# Patient Record
Sex: MALE | Race: WHITE | NOT HISPANIC OR LATINO | Employment: OTHER | URBAN - METROPOLITAN AREA
[De-identification: names, ages, dates, MRNs, and addresses within clinical notes are randomized per-mention and may not be internally consistent; named-entity substitution may affect disease eponyms.]

---

## 2019-01-01 RX ORDER — PSEUDOEPHED/ACETAMINOPH/DIPHEN 30MG-500MG
TABLET ORAL
Qty: 100 TABLET | Refills: 2 | OUTPATIENT
Start: 2019-01-01

## 2020-01-01 ENCOUNTER — APPOINTMENT (OUTPATIENT)
Dept: CT IMAGING | Facility: HOSPITAL | Age: 65
End: 2020-01-01

## 2020-01-01 ENCOUNTER — APPOINTMENT (OUTPATIENT)
Dept: GENERAL RADIOLOGY | Facility: HOSPITAL | Age: 65
End: 2020-01-01

## 2020-01-01 ENCOUNTER — APPOINTMENT (OUTPATIENT)
Dept: CARDIOLOGY | Facility: HOSPITAL | Age: 65
End: 2020-01-01

## 2020-01-01 ENCOUNTER — HOSPITAL ENCOUNTER (INPATIENT)
Facility: HOSPITAL | Age: 65
LOS: 1 days | End: 2020-04-14
Attending: EMERGENCY MEDICINE | Admitting: INTERNAL MEDICINE

## 2020-01-01 VITALS
TEMPERATURE: 98.2 F | DIASTOLIC BLOOD PRESSURE: 92 MMHG | BODY MASS INDEX: 36.01 KG/M2 | HEIGHT: 77 IN | WEIGHT: 305 LBS | OXYGEN SATURATION: 87 % | SYSTOLIC BLOOD PRESSURE: 121 MMHG | RESPIRATION RATE: 30 BRPM

## 2020-01-01 DIAGNOSIS — J96.21 ACUTE ON CHRONIC RESPIRATORY FAILURE WITH HYPOXIA (HCC): ICD-10-CM

## 2020-01-01 DIAGNOSIS — J40 BRONCHITIS: ICD-10-CM

## 2020-01-01 DIAGNOSIS — J18.9 PNEUMONIA OF LEFT LOWER LOBE DUE TO INFECTIOUS ORGANISM: ICD-10-CM

## 2020-01-01 DIAGNOSIS — Z20.822 SUSPECTED COVID-19 VIRUS INFECTION: ICD-10-CM

## 2020-01-01 DIAGNOSIS — A41.9 SEPSIS WITHOUT ACUTE ORGAN DYSFUNCTION, DUE TO UNSPECIFIED ORGANISM (HCC): ICD-10-CM

## 2020-01-01 DIAGNOSIS — I26.99 OTHER ACUTE PULMONARY EMBOLISM WITHOUT ACUTE COR PULMONALE (HCC): Primary | ICD-10-CM

## 2020-01-01 LAB
ABO GROUP BLD: NORMAL
ALBUMIN SERPL-MCNC: 2.9 G/DL (ref 3.5–5.2)
ALBUMIN SERPL-MCNC: 3 G/DL (ref 3.5–5.2)
ALBUMIN/GLOB SERPL: 0.9 G/DL
ALBUMIN/GLOB SERPL: 0.9 G/DL
ALP SERPL-CCNC: 107 U/L (ref 39–117)
ALP SERPL-CCNC: 98 U/L (ref 39–117)
ALT SERPL W P-5'-P-CCNC: 27 U/L (ref 1–41)
ALT SERPL W P-5'-P-CCNC: 31 U/L (ref 1–41)
ANION GAP SERPL CALCULATED.3IONS-SCNC: 12.8 MMOL/L (ref 5–15)
ANION GAP SERPL CALCULATED.3IONS-SCNC: 17.9 MMOL/L (ref 5–15)
AORTIC DIMENSIONLESS INDEX: 0.8 (DI)
ARTERIAL PATENCY WRIST A: POSITIVE
ARTERIAL PATENCY WRIST A: POSITIVE
AST SERPL-CCNC: 24 U/L (ref 1–40)
AST SERPL-CCNC: 25 U/L (ref 1–40)
ATMOSPHERIC PRESS: 748.7 MMHG
ATMOSPHERIC PRESS: 754.6 MMHG
B PARAPERT DNA SPEC QL NAA+PROBE: NOT DETECTED
B PERT DNA SPEC QL NAA+PROBE: NOT DETECTED
BASE EXCESS BLDA CALC-SCNC: -0.5 MMOL/L (ref 0–2)
BASE EXCESS BLDA CALC-SCNC: 1.1 MMOL/L (ref 0–2)
BASOPHILS # BLD AUTO: 0.03 10*3/MM3 (ref 0–0.2)
BASOPHILS # BLD AUTO: 0.04 10*3/MM3 (ref 0–0.2)
BASOPHILS NFR BLD AUTO: 0.1 % (ref 0–1.5)
BASOPHILS NFR BLD AUTO: 0.2 % (ref 0–1.5)
BDY SITE: ABNORMAL
BDY SITE: ABNORMAL
BH CV ECHO MEAS - ACS: 2.4 CM
BH CV ECHO MEAS - AI DEC SLOPE: 482 CM/SEC^2
BH CV ECHO MEAS - AI MAX PG: 70.2 MMHG
BH CV ECHO MEAS - AI MAX VEL: 419 CM/SEC
BH CV ECHO MEAS - AI P1/2T: 254.6 MSEC
BH CV ECHO MEAS - AO MAX PG (FULL): 5.4 MMHG
BH CV ECHO MEAS - AO MAX PG: 10.4 MMHG
BH CV ECHO MEAS - AO MEAN PG (FULL): 2 MMHG
BH CV ECHO MEAS - AO MEAN PG: 5 MMHG
BH CV ECHO MEAS - AO ROOT AREA (BSA CORRECTED): 1.6
BH CV ECHO MEAS - AO ROOT AREA: 15.2 CM^2
BH CV ECHO MEAS - AO ROOT DIAM: 4.4 CM
BH CV ECHO MEAS - AO V2 MAX: 161 CM/SEC
BH CV ECHO MEAS - AO V2 MEAN: 103 CM/SEC
BH CV ECHO MEAS - AO V2 VTI: 24.3 CM
BH CV ECHO MEAS - AVA(I,A): 4.2 CM^2
BH CV ECHO MEAS - AVA(I,D): 4.2 CM^2
BH CV ECHO MEAS - AVA(V,A): 3.7 CM^2
BH CV ECHO MEAS - AVA(V,D): 3.7 CM^2
BH CV ECHO MEAS - BSA(HAYCOCK): 2.8 M^2
BH CV ECHO MEAS - BSA: 2.7 M^2
BH CV ECHO MEAS - BZI_BMI: 36.2 KILOGRAMS/M^2
BH CV ECHO MEAS - BZI_METRIC_HEIGHT: 195.6 CM
BH CV ECHO MEAS - BZI_METRIC_WEIGHT: 138.3 KG
BH CV ECHO MEAS - EDV(CUBED): 125 ML
BH CV ECHO MEAS - EDV(MOD-SP2): 89 ML
BH CV ECHO MEAS - EDV(MOD-SP4): 160 ML
BH CV ECHO MEAS - EDV(TEICH): 118.2 ML
BH CV ECHO MEAS - EF(CUBED): 62.7 %
BH CV ECHO MEAS - EF(MOD-BP): 73 %
BH CV ECHO MEAS - EF(MOD-SP2): 71.9 %
BH CV ECHO MEAS - EF(MOD-SP4): 74.4 %
BH CV ECHO MEAS - EF(TEICH): 54 %
BH CV ECHO MEAS - ESV(CUBED): 46.7 ML
BH CV ECHO MEAS - ESV(MOD-SP2): 25 ML
BH CV ECHO MEAS - ESV(MOD-SP4): 41 ML
BH CV ECHO MEAS - ESV(TEICH): 54.4 ML
BH CV ECHO MEAS - FS: 28 %
BH CV ECHO MEAS - IVS/LVPW: 1
BH CV ECHO MEAS - IVSD: 1.3 CM
BH CV ECHO MEAS - LV DIASTOLIC VOL/BSA (35-75): 59.8 ML/M^2
BH CV ECHO MEAS - LV MASS(C)D: 261.8 GRAMS
BH CV ECHO MEAS - LV MASS(C)DI: 97.8 GRAMS/M^2
BH CV ECHO MEAS - LV MAX PG: 5 MMHG
BH CV ECHO MEAS - LV MEAN PG: 3 MMHG
BH CV ECHO MEAS - LV SYSTOLIC VOL/BSA (12-30): 15.3 ML/M^2
BH CV ECHO MEAS - LV V1 MAX: 112 CM/SEC
BH CV ECHO MEAS - LV V1 MEAN: 80.7 CM/SEC
BH CV ECHO MEAS - LV V1 VTI: 19.2 CM
BH CV ECHO MEAS - LVIDD: 5 CM
BH CV ECHO MEAS - LVIDS: 3.6 CM
BH CV ECHO MEAS - LVLD AP2: 9.1 CM
BH CV ECHO MEAS - LVLD AP4: 9.4 CM
BH CV ECHO MEAS - LVLS AP2: 8 CM
BH CV ECHO MEAS - LVLS AP4: 7.5 CM
BH CV ECHO MEAS - LVOT AREA (M): 5.3 CM^2
BH CV ECHO MEAS - LVOT AREA: 5.3 CM^2
BH CV ECHO MEAS - LVOT DIAM: 2.6 CM
BH CV ECHO MEAS - LVPWD: 1.3 CM
BH CV ECHO MEAS - MV DEC SLOPE: 663 CM/SEC^2
BH CV ECHO MEAS - MV MAX PG: 6.5 MMHG
BH CV ECHO MEAS - MV MEAN PG: 3 MMHG
BH CV ECHO MEAS - MV P1/2T MAX VEL: 141 CM/SEC
BH CV ECHO MEAS - MV P1/2T: 62.3 MSEC
BH CV ECHO MEAS - MV V2 MAX: 127 CM/SEC
BH CV ECHO MEAS - MV V2 MEAN: 78.7 CM/SEC
BH CV ECHO MEAS - MV V2 VTI: 20.3 CM
BH CV ECHO MEAS - MVA P1/2T LCG: 1.6 CM^2
BH CV ECHO MEAS - MVA(P1/2T): 3.5 CM^2
BH CV ECHO MEAS - MVA(VTI): 5 CM^2
BH CV ECHO MEAS - PA ACC TIME: 0.09 SEC
BH CV ECHO MEAS - PA MAX PG (FULL): 3.5 MMHG
BH CV ECHO MEAS - PA MAX PG: 6 MMHG
BH CV ECHO MEAS - PA PR(ACCEL): 37.6 MMHG
BH CV ECHO MEAS - PA V2 MAX: 122 CM/SEC
BH CV ECHO MEAS - PVA(V,A): 2.7 CM^2
BH CV ECHO MEAS - PVA(V,D): 2.7 CM^2
BH CV ECHO MEAS - QP/QS: 0.43
BH CV ECHO MEAS - RAP SYSTOLE: 3 MMHG
BH CV ECHO MEAS - RV MAX PG: 2.5 MMHG
BH CV ECHO MEAS - RV MEAN PG: 1 MMHG
BH CV ECHO MEAS - RV V1 MAX: 78.3 CM/SEC
BH CV ECHO MEAS - RV V1 MEAN: 49 CM/SEC
BH CV ECHO MEAS - RV V1 VTI: 10.6 CM
BH CV ECHO MEAS - RVOT AREA: 4.2 CM^2
BH CV ECHO MEAS - RVOT DIAM: 2.3 CM
BH CV ECHO MEAS - RVSP: 29.6 MMHG
BH CV ECHO MEAS - SI(AO): 138.1 ML/M^2
BH CV ECHO MEAS - SI(CUBED): 29.3 ML/M^2
BH CV ECHO MEAS - SI(LVOT): 38.1 ML/M^2
BH CV ECHO MEAS - SI(MOD-SP2): 23.9 ML/M^2
BH CV ECHO MEAS - SI(MOD-SP4): 44.5 ML/M^2
BH CV ECHO MEAS - SI(TEICH): 23.8 ML/M^2
BH CV ECHO MEAS - SV(AO): 369.5 ML
BH CV ECHO MEAS - SV(CUBED): 78.3 ML
BH CV ECHO MEAS - SV(LVOT): 101.9 ML
BH CV ECHO MEAS - SV(MOD-SP2): 64 ML
BH CV ECHO MEAS - SV(MOD-SP4): 119 ML
BH CV ECHO MEAS - SV(RVOT): 44 ML
BH CV ECHO MEAS - SV(TEICH): 63.8 ML
BH CV ECHO MEAS - TAPSE (>1.6): 2.2 CM
BH CV ECHO MEAS - TR MAX VEL: 258 CM/SEC
BH CV LOW VAS LEFT DISTAL FEMORAL SPONT: 1
BH CV LOW VAS LEFT GASTRONEMIUS VESSEL: 1
BH CV LOW VAS LEFT GREATER SAPH BK VESSEL: 1
BH CV LOW VAS LEFT LESSER SAPH VESSEL: 1
BH CV LOW VAS LEFT MID FEMORAL SPONT: 1
BH CV LOW VAS LEFT POPLITEAL SPONT: 1
BH CV LOW VAS LEFT PROXIMAL FEMORAL SPONT: 1
BH CV LOW VAS LEFT SOLEAL VESSEL: 1
BH CV LOW VAS RIGHT PERONEAL VESSEL: 1
BH CV LOW VAS RIGHT POPLITEAL SPONT: 1
BH CV LOW VAS RIGHT POSTERIOR TIBIAL VESSEL: 1
BH CV LOW VAS RIGHT SOLEAL VESSEL: 1
BH CV LOWER VASCULAR LEFT COMMON FEMORAL AUGMENT: NORMAL
BH CV LOWER VASCULAR LEFT COMMON FEMORAL COMPETENT: NORMAL
BH CV LOWER VASCULAR LEFT COMMON FEMORAL COMPRESS: NORMAL
BH CV LOWER VASCULAR LEFT COMMON FEMORAL PHASIC: NORMAL
BH CV LOWER VASCULAR LEFT COMMON FEMORAL SPONT: NORMAL
BH CV LOWER VASCULAR LEFT DISTAL FEMORAL COMPRESS: NORMAL
BH CV LOWER VASCULAR LEFT DISTAL FEMORAL THROMBUS: NORMAL
BH CV LOWER VASCULAR LEFT GASTRONEMIUS COMPRESS: NORMAL
BH CV LOWER VASCULAR LEFT GASTRONEMIUS THROMBUS: NORMAL
BH CV LOWER VASCULAR LEFT GREATER SAPH AK COMPRESS: NORMAL
BH CV LOWER VASCULAR LEFT GREATER SAPH BK COMPRESS: NORMAL
BH CV LOWER VASCULAR LEFT GREATER SAPH BK THROMBUS: NORMAL
BH CV LOWER VASCULAR LEFT LESSER SAPH COMPRESS: NORMAL
BH CV LOWER VASCULAR LEFT LESSER SAPH THROMBUS: NORMAL
BH CV LOWER VASCULAR LEFT MID FEMORAL AUGMENT: NORMAL
BH CV LOWER VASCULAR LEFT MID FEMORAL COMPETENT: NORMAL
BH CV LOWER VASCULAR LEFT MID FEMORAL COMPRESS: NORMAL
BH CV LOWER VASCULAR LEFT MID FEMORAL PHASIC: NORMAL
BH CV LOWER VASCULAR LEFT MID FEMORAL SPONT: NORMAL
BH CV LOWER VASCULAR LEFT MID FEMORAL THROMBUS: NORMAL
BH CV LOWER VASCULAR LEFT PERONEAL COMPRESS: NORMAL
BH CV LOWER VASCULAR LEFT POPLITEAL AUGMENT: NORMAL
BH CV LOWER VASCULAR LEFT POPLITEAL COMPETENT: NORMAL
BH CV LOWER VASCULAR LEFT POPLITEAL COMPRESS: NORMAL
BH CV LOWER VASCULAR LEFT POPLITEAL PHASIC: NORMAL
BH CV LOWER VASCULAR LEFT POPLITEAL SPONT: NORMAL
BH CV LOWER VASCULAR LEFT POPLITEAL THROMBUS: NORMAL
BH CV LOWER VASCULAR LEFT POSTERIOR TIBIAL COMPRESS: NORMAL
BH CV LOWER VASCULAR LEFT PROFUNDA FEMORAL COMPRESS: NORMAL
BH CV LOWER VASCULAR LEFT PROXIMAL FEMORAL COMPRESS: NORMAL
BH CV LOWER VASCULAR LEFT PROXIMAL FEMORAL THROMBUS: NORMAL
BH CV LOWER VASCULAR LEFT SAPHENOFEMORAL JUNCTION COMPRESS: NORMAL
BH CV LOWER VASCULAR LEFT SOLEAL COMPRESS: NORMAL
BH CV LOWER VASCULAR LEFT SOLEAL THROMBUS: NORMAL
BH CV LOWER VASCULAR RIGHT COMMON FEMORAL AUGMENT: NORMAL
BH CV LOWER VASCULAR RIGHT COMMON FEMORAL COMPETENT: NORMAL
BH CV LOWER VASCULAR RIGHT COMMON FEMORAL COMPRESS: NORMAL
BH CV LOWER VASCULAR RIGHT COMMON FEMORAL PHASIC: NORMAL
BH CV LOWER VASCULAR RIGHT COMMON FEMORAL SPONT: NORMAL
BH CV LOWER VASCULAR RIGHT DISTAL FEMORAL COMPRESS: NORMAL
BH CV LOWER VASCULAR RIGHT GASTRONEMIUS COMPRESS: NORMAL
BH CV LOWER VASCULAR RIGHT GREATER SAPH AK COMPRESS: NORMAL
BH CV LOWER VASCULAR RIGHT GREATER SAPH BK COMPRESS: NORMAL
BH CV LOWER VASCULAR RIGHT LESSER SAPH COMPRESS: NORMAL
BH CV LOWER VASCULAR RIGHT MID FEMORAL AUGMENT: NORMAL
BH CV LOWER VASCULAR RIGHT MID FEMORAL COMPETENT: NORMAL
BH CV LOWER VASCULAR RIGHT MID FEMORAL COMPRESS: NORMAL
BH CV LOWER VASCULAR RIGHT MID FEMORAL PHASIC: NORMAL
BH CV LOWER VASCULAR RIGHT MID FEMORAL SPONT: NORMAL
BH CV LOWER VASCULAR RIGHT PERONEAL COMPRESS: NORMAL
BH CV LOWER VASCULAR RIGHT PERONEAL THROMBUS: NORMAL
BH CV LOWER VASCULAR RIGHT POPLITEAL AUGMENT: NORMAL
BH CV LOWER VASCULAR RIGHT POPLITEAL COMPETENT: NORMAL
BH CV LOWER VASCULAR RIGHT POPLITEAL COMPRESS: NORMAL
BH CV LOWER VASCULAR RIGHT POPLITEAL PHASIC: NORMAL
BH CV LOWER VASCULAR RIGHT POPLITEAL SPONT: NORMAL
BH CV LOWER VASCULAR RIGHT POPLITEAL THROMBUS: NORMAL
BH CV LOWER VASCULAR RIGHT POSTERIOR TIBIAL COMPRESS: NORMAL
BH CV LOWER VASCULAR RIGHT POSTERIOR TIBIAL THROMBUS: NORMAL
BH CV LOWER VASCULAR RIGHT PROFUNDA FEMORAL COMPRESS: NORMAL
BH CV LOWER VASCULAR RIGHT PROXIMAL FEMORAL COMPRESS: NORMAL
BH CV LOWER VASCULAR RIGHT SAPHENOFEMORAL JUNCTION COMPRESS: NORMAL
BH CV LOWER VASCULAR RIGHT SOLEAL COMPRESS: NORMAL
BH CV LOWER VASCULAR RIGHT SOLEAL THROMBUS: NORMAL
BH CV XLRA - RV BASE: 3.6 CM
BH CV XLRA - RV LENGTH: 8.6 CM
BH CV XLRA - RV MID: 4.3 CM
BH CV XLRA - TDI S': 24 CM/SEC
BILIRUB SERPL-MCNC: 1.5 MG/DL (ref 0.2–1.2)
BILIRUB SERPL-MCNC: 1.9 MG/DL (ref 0.2–1.2)
BLD GP AB SCN SERPL QL: NEGATIVE
BUN BLD-MCNC: 35 MG/DL (ref 8–23)
BUN BLD-MCNC: 45 MG/DL (ref 8–23)
BUN/CREAT SERPL: 24.6 (ref 7–25)
BUN/CREAT SERPL: 45.5 (ref 7–25)
C PNEUM DNA NPH QL NAA+NON-PROBE: NOT DETECTED
CALCIUM SPEC-SCNC: 9.4 MG/DL (ref 8.6–10.5)
CALCIUM SPEC-SCNC: 9.5 MG/DL (ref 8.6–10.5)
CHLORIDE SERPL-SCNC: 91 MMOL/L (ref 98–107)
CHLORIDE SERPL-SCNC: 92 MMOL/L (ref 98–107)
CHOLEST SERPL-MCNC: 87 MG/DL (ref 0–200)
CO2 SERPL-SCNC: 20.1 MMOL/L (ref 22–29)
CO2 SERPL-SCNC: 23.2 MMOL/L (ref 22–29)
CREAT BLD-MCNC: 0.99 MG/DL (ref 0.76–1.27)
CREAT BLD-MCNC: 1.42 MG/DL (ref 0.76–1.27)
CRP SERPL-MCNC: 33.99 MG/DL (ref 0–0.5)
D DIMER PPP FEU-MCNC: 5.41 MCGFEU/ML (ref 0–0.49)
D-LACTATE SERPL-SCNC: 2 MMOL/L (ref 0.5–2)
D-LACTATE SERPL-SCNC: 2.9 MMOL/L (ref 0.5–2)
D-LACTATE SERPL-SCNC: 3.8 MMOL/L (ref 0.5–2)
DEPRECATED RDW RBC AUTO: 41.2 FL (ref 37–54)
DEPRECATED RDW RBC AUTO: 41.5 FL (ref 37–54)
EOSINOPHIL # BLD AUTO: 0 10*3/MM3 (ref 0–0.4)
EOSINOPHIL # BLD AUTO: 0.01 10*3/MM3 (ref 0–0.4)
EOSINOPHIL NFR BLD AUTO: 0 % (ref 0.3–6.2)
EOSINOPHIL NFR BLD AUTO: 0 % (ref 0.3–6.2)
ERYTHROCYTE [DISTWIDTH] IN BLOOD BY AUTOMATED COUNT: 12.8 % (ref 12.3–15.4)
ERYTHROCYTE [DISTWIDTH] IN BLOOD BY AUTOMATED COUNT: 12.9 % (ref 12.3–15.4)
FERRITIN SERPL-MCNC: 793 NG/ML (ref 30–400)
FLUAV H1 2009 PAND RNA NPH QL NAA+PROBE: NOT DETECTED
FLUAV H1 HA GENE NPH QL NAA+PROBE: NOT DETECTED
FLUAV H3 RNA NPH QL NAA+PROBE: NOT DETECTED
FLUAV SUBTYP SPEC NAA+PROBE: NOT DETECTED
FLUBV RNA ISLT QL NAA+PROBE: NOT DETECTED
GAS FLOW AIRWAY: 15 LPM
GFR SERPL CREATININE-BSD FRML MDRD: 50 ML/MIN/1.73
GFR SERPL CREATININE-BSD FRML MDRD: 76 ML/MIN/1.73
GLOBULIN UR ELPH-MCNC: 3.2 GM/DL
GLOBULIN UR ELPH-MCNC: 3.4 GM/DL
GLUCOSE BLD-MCNC: 339 MG/DL (ref 65–99)
GLUCOSE BLD-MCNC: 346 MG/DL (ref 65–99)
GLUCOSE BLDC GLUCOMTR-MCNC: 299 MG/DL (ref 70–130)
GLUCOSE BLDC GLUCOMTR-MCNC: 323 MG/DL (ref 70–130)
GLUCOSE BLDC GLUCOMTR-MCNC: 324 MG/DL (ref 70–130)
GLUCOSE BLDC GLUCOMTR-MCNC: 380 MG/DL (ref 70–130)
GLUCOSE BLDC GLUCOMTR-MCNC: 391 MG/DL (ref 70–130)
HADV DNA SPEC NAA+PROBE: NOT DETECTED
HBA1C MFR BLD: 6.8 % (ref 4.8–5.6)
HCO3 BLDA-SCNC: 24.3 MMOL/L (ref 22–28)
HCO3 BLDA-SCNC: 25.5 MMOL/L (ref 22–28)
HCOV 229E RNA SPEC QL NAA+PROBE: NOT DETECTED
HCOV HKU1 RNA SPEC QL NAA+PROBE: NOT DETECTED
HCOV NL63 RNA SPEC QL NAA+PROBE: NOT DETECTED
HCOV OC43 RNA SPEC QL NAA+PROBE: NOT DETECTED
HCT VFR BLD AUTO: 38.9 % (ref 37.5–51)
HCT VFR BLD AUTO: 42.1 % (ref 37.5–51)
HDLC SERPL-MCNC: 14 MG/DL (ref 40–60)
HGB BLD-MCNC: 13.2 G/DL (ref 13–17.7)
HGB BLD-MCNC: 14.4 G/DL (ref 13–17.7)
HMPV RNA NPH QL NAA+NON-PROBE: NOT DETECTED
HOROWITZ INDEX BLD+IHG-RTO: 90 %
HPIV1 RNA SPEC QL NAA+PROBE: NOT DETECTED
HPIV2 RNA SPEC QL NAA+PROBE: NOT DETECTED
HPIV3 RNA NPH QL NAA+PROBE: NOT DETECTED
HPIV4 P GENE NPH QL NAA+PROBE: NOT DETECTED
IMM GRANULOCYTES # BLD AUTO: 0.18 10*3/MM3 (ref 0–0.05)
IMM GRANULOCYTES # BLD AUTO: 0.23 10*3/MM3 (ref 0–0.05)
IMM GRANULOCYTES NFR BLD AUTO: 0.8 % (ref 0–0.5)
IMM GRANULOCYTES NFR BLD AUTO: 1 % (ref 0–0.5)
LACTATE HOLD SPECIMEN: NORMAL
LDH SERPL-CCNC: 243 U/L (ref 135–225)
LDLC SERPL CALC-MCNC: 34 MG/DL (ref 0–100)
LDLC/HDLC SERPL: 2.4 {RATIO}
LEFT ATRIUM VOLUME INDEX: 17 ML/M2
LYMPHOCYTES # BLD AUTO: 0.33 10*3/MM3 (ref 0.7–3.1)
LYMPHOCYTES # BLD AUTO: 0.33 10*3/MM3 (ref 0.7–3.1)
LYMPHOCYTES NFR BLD AUTO: 1.5 % (ref 19.6–45.3)
LYMPHOCYTES NFR BLD AUTO: 1.5 % (ref 19.6–45.3)
M PNEUMO IGG SER IA-ACNC: NOT DETECTED
MAGNESIUM SERPL-MCNC: 1.4 MG/DL (ref 1.6–2.4)
MAXIMAL PREDICTED HEART RATE: 155 BPM
MCH RBC QN AUTO: 29.9 PG (ref 26.6–33)
MCH RBC QN AUTO: 30.3 PG (ref 26.6–33)
MCHC RBC AUTO-ENTMCNC: 33.9 G/DL (ref 31.5–35.7)
MCHC RBC AUTO-ENTMCNC: 34.2 G/DL (ref 31.5–35.7)
MCV RBC AUTO: 88.2 FL (ref 79–97)
MCV RBC AUTO: 88.4 FL (ref 79–97)
MODALITY: ABNORMAL
MODALITY: ABNORMAL
MONOCYTES # BLD AUTO: 1.17 10*3/MM3 (ref 0.1–0.9)
MONOCYTES # BLD AUTO: 1.18 10*3/MM3 (ref 0.1–0.9)
MONOCYTES NFR BLD AUTO: 5.2 % (ref 5–12)
MONOCYTES NFR BLD AUTO: 5.3 % (ref 5–12)
NEUTROPHILS # BLD AUTO: 20.56 10*3/MM3 (ref 1.7–7)
NEUTROPHILS # BLD AUTO: 20.71 10*3/MM3 (ref 1.7–7)
NEUTROPHILS NFR BLD AUTO: 92.1 % (ref 42.7–76)
NEUTROPHILS NFR BLD AUTO: 92.3 % (ref 42.7–76)
NRBC BLD AUTO-RTO: 0 /100 WBC (ref 0–0.2)
NRBC BLD AUTO-RTO: 0 /100 WBC (ref 0–0.2)
NT-PROBNP SERPL-MCNC: 2097 PG/ML (ref 5–900)
O2 A-A PPRESDIFF RESPIRATORY: 0.1 MMHG
PCO2 BLDA: 38.5 MM HG (ref 35–45)
PCO2 BLDA: 39.4 MM HG (ref 35–45)
PH BLDA: 7.4 PH UNITS (ref 7.35–7.45)
PH BLDA: 7.43 PH UNITS (ref 7.35–7.45)
PLATELET # BLD AUTO: 221 10*3/MM3 (ref 140–450)
PLATELET # BLD AUTO: 247 10*3/MM3 (ref 140–450)
PMV BLD AUTO: 10.4 FL (ref 6–12)
PMV BLD AUTO: 10.7 FL (ref 6–12)
PO2 BLDA: 58 MM HG (ref 80–100)
PO2 BLDA: 66.4 MM HG (ref 80–100)
POTASSIUM BLD-SCNC: 4.4 MMOL/L (ref 3.5–5.2)
POTASSIUM BLD-SCNC: 4.9 MMOL/L (ref 3.5–5.2)
PROCALCITONIN SERPL-MCNC: 1.28 NG/ML (ref 0.1–0.25)
PROT SERPL-MCNC: 6.1 G/DL (ref 6–8.5)
PROT SERPL-MCNC: 6.4 G/DL (ref 6–8.5)
RBC # BLD AUTO: 4.41 10*6/MM3 (ref 4.14–5.8)
RBC # BLD AUTO: 4.76 10*6/MM3 (ref 4.14–5.8)
RH BLD: POSITIVE
RHINOVIRUS RNA SPEC NAA+PROBE: NOT DETECTED
RSV RNA NPH QL NAA+NON-PROBE: NOT DETECTED
SAO2 % BLDCOA: 90.5 % (ref 92–99)
SAO2 % BLDCOA: 92.8 % (ref 92–99)
SARS-COV-2 RNA RESP QL NAA+PROBE: NOT DETECTED
SET MECH RESP RATE: 24
SODIUM BLD-SCNC: 128 MMOL/L (ref 136–145)
SODIUM BLD-SCNC: 129 MMOL/L (ref 136–145)
STRESS TARGET HR: 132 BPM
T&S EXPIRATION DATE: NORMAL
TOTAL RATE: 22 BREATHS/MINUTE
TRIGL SERPL-MCNC: 197 MG/DL (ref 0–150)
TROPONIN T SERPL-MCNC: 0.01 NG/ML (ref 0–0.03)
TROPONIN T SERPL-MCNC: 0.01 NG/ML (ref 0–0.03)
TROPONIN T SERPL-MCNC: 0.02 NG/ML (ref 0–0.03)
TSH SERPL DL<=0.05 MIU/L-ACNC: 0.61 UIU/ML (ref 0.27–4.2)
VANCOMYCIN SERPL-MCNC: 7.5 MCG/ML (ref 5–40)
VLDLC SERPL-MCNC: 39.4 MG/DL (ref 5–40)
WBC NRBC COR # BLD: 22.33 10*3/MM3 (ref 3.4–10.8)
WBC NRBC COR # BLD: 22.44 10*3/MM3 (ref 3.4–10.8)

## 2020-01-01 PROCEDURE — 94002 VENT MGMT INPAT INIT DAY: CPT

## 2020-01-01 PROCEDURE — 99222 1ST HOSP IP/OBS MODERATE 55: CPT | Performed by: INTERNAL MEDICINE

## 2020-01-01 PROCEDURE — 94799 UNLISTED PULMONARY SVC/PX: CPT

## 2020-01-01 PROCEDURE — 83615 LACTATE (LD) (LDH) ENZYME: CPT | Performed by: EMERGENCY MEDICINE

## 2020-01-01 PROCEDURE — 25010000002 ONDANSETRON PER 1 MG: Performed by: INTERNAL MEDICINE

## 2020-01-01 PROCEDURE — 84443 ASSAY THYROID STIM HORMONE: CPT | Performed by: INTERNAL MEDICINE

## 2020-01-01 PROCEDURE — 0 IOPAMIDOL PER 1 ML: Performed by: EMERGENCY MEDICINE

## 2020-01-01 PROCEDURE — 93005 ELECTROCARDIOGRAM TRACING: CPT | Performed by: EMERGENCY MEDICINE

## 2020-01-01 PROCEDURE — 63710000001 INSULIN GLARGINE PER 5 UNITS: Performed by: INTERNAL MEDICINE

## 2020-01-01 PROCEDURE — 84484 ASSAY OF TROPONIN QUANT: CPT | Performed by: INTERNAL MEDICINE

## 2020-01-01 PROCEDURE — 63710000001 INSULIN LISPRO (HUMAN) PER 5 UNITS: Performed by: INTERNAL MEDICINE

## 2020-01-01 PROCEDURE — 82962 GLUCOSE BLOOD TEST: CPT

## 2020-01-01 PROCEDURE — 92950 HEART/LUNG RESUSCITATION CPR: CPT

## 2020-01-01 PROCEDURE — P9016 RBC LEUKOCYTES REDUCED: HCPCS

## 2020-01-01 PROCEDURE — 25010000002 AMIODARONE IN DEXTROSE 5% 150-4.21 MG/100ML-% SOLUTION: Performed by: INTERNAL MEDICINE

## 2020-01-01 PROCEDURE — 94660 CPAP INITIATION&MGMT: CPT

## 2020-01-01 PROCEDURE — 80061 LIPID PANEL: CPT | Performed by: INTERNAL MEDICINE

## 2020-01-01 PROCEDURE — 25010000002 AMIODARONE IN DEXTROSE 5% 360-4.14 MG/200ML-% SOLUTION: Performed by: INTERNAL MEDICINE

## 2020-01-01 PROCEDURE — 86850 RBC ANTIBODY SCREEN: CPT | Performed by: INTERNAL MEDICINE

## 2020-01-01 PROCEDURE — 25010000002 EPINEPHRINE PF 1 MG/10ML SOLUTION PREFILLED SYRINGE: Performed by: INTERNAL MEDICINE

## 2020-01-01 PROCEDURE — 93010 ELECTROCARDIOGRAM REPORT: CPT | Performed by: INTERNAL MEDICINE

## 2020-01-01 PROCEDURE — 86900 BLOOD TYPING SEROLOGIC ABO: CPT

## 2020-01-01 PROCEDURE — 83735 ASSAY OF MAGNESIUM: CPT | Performed by: EMERGENCY MEDICINE

## 2020-01-01 PROCEDURE — 82803 BLOOD GASES ANY COMBINATION: CPT

## 2020-01-01 PROCEDURE — 82728 ASSAY OF FERRITIN: CPT | Performed by: EMERGENCY MEDICINE

## 2020-01-01 PROCEDURE — 71045 X-RAY EXAM CHEST 1 VIEW: CPT

## 2020-01-01 PROCEDURE — 86900 BLOOD TYPING SEROLOGIC ABO: CPT | Performed by: INTERNAL MEDICINE

## 2020-01-01 PROCEDURE — 80053 COMPREHEN METABOLIC PANEL: CPT | Performed by: INTERNAL MEDICINE

## 2020-01-01 PROCEDURE — 93306 TTE W/DOPPLER COMPLETE: CPT | Performed by: INTERNAL MEDICINE

## 2020-01-01 PROCEDURE — 36430 TRANSFUSION BLD/BLD COMPNT: CPT

## 2020-01-01 PROCEDURE — 25010000002 VANCOMYCIN 10 G RECONSTITUTED SOLUTION: Performed by: EMERGENCY MEDICINE

## 2020-01-01 PROCEDURE — 86920 COMPATIBILITY TEST SPIN: CPT

## 2020-01-01 PROCEDURE — 87040 BLOOD CULTURE FOR BACTERIA: CPT | Performed by: EMERGENCY MEDICINE

## 2020-01-01 PROCEDURE — 85025 COMPLETE CBC W/AUTO DIFF WBC: CPT | Performed by: EMERGENCY MEDICINE

## 2020-01-01 PROCEDURE — 80053 COMPREHEN METABOLIC PANEL: CPT | Performed by: EMERGENCY MEDICINE

## 2020-01-01 PROCEDURE — 94640 AIRWAY INHALATION TREATMENT: CPT

## 2020-01-01 PROCEDURE — 83605 ASSAY OF LACTIC ACID: CPT | Performed by: INTERNAL MEDICINE

## 2020-01-01 PROCEDURE — 86140 C-REACTIVE PROTEIN: CPT | Performed by: EMERGENCY MEDICINE

## 2020-01-01 PROCEDURE — 86901 BLOOD TYPING SEROLOGIC RH(D): CPT | Performed by: INTERNAL MEDICINE

## 2020-01-01 PROCEDURE — 36600 WITHDRAWAL OF ARTERIAL BLOOD: CPT

## 2020-01-01 PROCEDURE — 83036 HEMOGLOBIN GLYCOSYLATED A1C: CPT | Performed by: INTERNAL MEDICINE

## 2020-01-01 PROCEDURE — 93306 TTE W/DOPPLER COMPLETE: CPT

## 2020-01-01 PROCEDURE — 84484 ASSAY OF TROPONIN QUANT: CPT | Performed by: EMERGENCY MEDICINE

## 2020-01-01 PROCEDURE — 99285 EMERGENCY DEPT VISIT HI MDM: CPT

## 2020-01-01 PROCEDURE — 25010000003 ATROPINE SULFATE 1 MG/10ML SOLUTION PREFILLED SYRINGE

## 2020-01-01 PROCEDURE — 85379 FIBRIN DEGRADATION QUANT: CPT | Performed by: EMERGENCY MEDICINE

## 2020-01-01 PROCEDURE — 36415 COLL VENOUS BLD VENIPUNCTURE: CPT

## 2020-01-01 PROCEDURE — 86901 BLOOD TYPING SEROLOGIC RH(D): CPT

## 2020-01-01 PROCEDURE — 25010000002 PROPOFOL 10 MG/ML EMULSION

## 2020-01-01 PROCEDURE — 31500 INSERT EMERGENCY AIRWAY: CPT | Performed by: INTERNAL MEDICINE

## 2020-01-01 PROCEDURE — 71275 CT ANGIOGRAPHY CHEST: CPT

## 2020-01-01 PROCEDURE — 93005 ELECTROCARDIOGRAM TRACING: CPT | Performed by: HOSPITALIST

## 2020-01-01 PROCEDURE — 0BH17EZ INSERTION OF ENDOTRACHEAL AIRWAY INTO TRACHEA, VIA NATURAL OR ARTIFICIAL OPENING: ICD-10-PCS | Performed by: INTERNAL MEDICINE

## 2020-01-01 PROCEDURE — 87635 SARS-COV-2 COVID-19 AMP PRB: CPT | Performed by: EMERGENCY MEDICINE

## 2020-01-01 PROCEDURE — 80202 ASSAY OF VANCOMYCIN: CPT | Performed by: INTERNAL MEDICINE

## 2020-01-01 PROCEDURE — 94667 MNPJ CHEST WALL 1ST: CPT

## 2020-01-01 PROCEDURE — 25010000002 ENOXAPARIN PER 10 MG: Performed by: INTERNAL MEDICINE

## 2020-01-01 PROCEDURE — 83880 ASSAY OF NATRIURETIC PEPTIDE: CPT | Performed by: INTERNAL MEDICINE

## 2020-01-01 PROCEDURE — 83605 ASSAY OF LACTIC ACID: CPT | Performed by: EMERGENCY MEDICINE

## 2020-01-01 PROCEDURE — 25010000002 ENOXAPARIN PER 10 MG: Performed by: EMERGENCY MEDICINE

## 2020-01-01 PROCEDURE — 93970 EXTREMITY STUDY: CPT

## 2020-01-01 PROCEDURE — 93005 ELECTROCARDIOGRAM TRACING: CPT | Performed by: INTERNAL MEDICINE

## 2020-01-01 PROCEDURE — 84145 PROCALCITONIN (PCT): CPT | Performed by: EMERGENCY MEDICINE

## 2020-01-01 PROCEDURE — 25010000002 CEFEPIME PER 500 MG: Performed by: EMERGENCY MEDICINE

## 2020-01-01 PROCEDURE — 85025 COMPLETE CBC W/AUTO DIFF WBC: CPT | Performed by: INTERNAL MEDICINE

## 2020-01-01 PROCEDURE — 25010000002 DIGOXIN PER 500 MCG: Performed by: INTERNAL MEDICINE

## 2020-01-01 PROCEDURE — 0100U HC BIOFIRE FILMARRAY RESP PANEL 2: CPT | Performed by: NURSE PRACTITIONER

## 2020-01-01 RX ORDER — SODIUM CHLORIDE FOR INHALATION 7 %
4 VIAL, NEBULIZER (ML) INHALATION
Status: DISCONTINUED | OUTPATIENT
Start: 2020-01-01 | End: 2020-04-15 | Stop reason: HOSPADM

## 2020-01-01 RX ORDER — ACETAMINOPHEN 325 MG/1
650 TABLET ORAL EVERY 4 HOURS PRN
COMMUNITY

## 2020-01-01 RX ORDER — SODIUM CHLORIDE 0.9 % (FLUSH) 0.9 %
10 SYRINGE (ML) INJECTION EVERY 12 HOURS SCHEDULED
Status: DISCONTINUED | OUTPATIENT
Start: 2020-01-01 | End: 2020-04-15 | Stop reason: HOSPADM

## 2020-01-01 RX ORDER — VENLAFAXINE 75 MG/1
75 TABLET ORAL 2 TIMES DAILY WITH MEALS
Status: DISCONTINUED | OUTPATIENT
Start: 2020-01-01 | End: 2020-04-15 | Stop reason: HOSPADM

## 2020-01-01 RX ORDER — VENLAFAXINE 75 MG/1
75 TABLET ORAL 2 TIMES DAILY
COMMUNITY

## 2020-01-01 RX ORDER — POLYETHYLENE GLYCOL 3350 17 G/17G
17 POWDER, FOR SOLUTION ORAL DAILY PRN
COMMUNITY

## 2020-01-01 RX ORDER — ONDANSETRON 4 MG/1
4 TABLET, FILM COATED ORAL EVERY 8 HOURS PRN
COMMUNITY

## 2020-01-01 RX ORDER — INSULIN GLARGINE 100 [IU]/ML
56 INJECTION, SOLUTION SUBCUTANEOUS 2 TIMES DAILY
Status: DISCONTINUED | OUTPATIENT
Start: 2020-01-01 | End: 2020-04-15 | Stop reason: HOSPADM

## 2020-01-01 RX ORDER — ATORVASTATIN CALCIUM 20 MG/1
20 TABLET, FILM COATED ORAL DAILY
Status: DISCONTINUED | OUTPATIENT
Start: 2020-01-01 | End: 2020-04-15 | Stop reason: HOSPADM

## 2020-01-01 RX ORDER — NITROGLYCERIN 0.4 MG/1
0.4 TABLET SUBLINGUAL
Status: DISCONTINUED | OUTPATIENT
Start: 2020-01-01 | End: 2020-04-15 | Stop reason: HOSPADM

## 2020-01-01 RX ORDER — HYDROCODONE BITARTRATE AND ACETAMINOPHEN 7.5; 325 MG/1; MG/1
1 TABLET ORAL EVERY 4 HOURS PRN
COMMUNITY

## 2020-01-01 RX ORDER — ASPIRIN 325 MG
325 TABLET ORAL DAILY
Status: DISCONTINUED | OUTPATIENT
Start: 2020-01-01 | End: 2020-04-15 | Stop reason: HOSPADM

## 2020-01-01 RX ORDER — AMOXICILLIN 250 MG
1 CAPSULE ORAL DAILY
Status: DISCONTINUED | OUTPATIENT
Start: 2020-01-01 | End: 2020-04-15 | Stop reason: HOSPADM

## 2020-01-01 RX ORDER — SODIUM CHLORIDE 0.9 % (FLUSH) 0.9 %
10 SYRINGE (ML) INJECTION AS NEEDED
Status: DISCONTINUED | OUTPATIENT
Start: 2020-01-01 | End: 2020-04-15 | Stop reason: HOSPADM

## 2020-01-01 RX ORDER — AMOXICILLIN 250 MG
1 CAPSULE ORAL DAILY
COMMUNITY

## 2020-01-01 RX ORDER — VENLAFAXINE HYDROCHLORIDE 75 MG/1
CAPSULE, EXTENDED RELEASE ORAL
Qty: 30 CAPSULE | Refills: 2 | OUTPATIENT
Start: 2020-01-01

## 2020-01-01 RX ORDER — IPRATROPIUM BROMIDE AND ALBUTEROL SULFATE 2.5; .5 MG/3ML; MG/3ML
3 SOLUTION RESPIRATORY (INHALATION) ONCE
Status: COMPLETED | OUTPATIENT
Start: 2020-01-01 | End: 2020-01-01

## 2020-01-01 RX ORDER — SIMVASTATIN 40 MG
40 TABLET ORAL NIGHTLY
COMMUNITY

## 2020-01-01 RX ORDER — ASPIRIN 325 MG
325 TABLET ORAL DAILY
COMMUNITY

## 2020-01-01 RX ORDER — INSULIN GLARGINE 100 [IU]/ML
56 INJECTION, SOLUTION SUBCUTANEOUS 2 TIMES DAILY
COMMUNITY

## 2020-01-01 RX ORDER — ONDANSETRON 2 MG/ML
4 INJECTION INTRAMUSCULAR; INTRAVENOUS EVERY 6 HOURS PRN
Status: DISCONTINUED | OUTPATIENT
Start: 2020-01-01 | End: 2020-04-15 | Stop reason: HOSPADM

## 2020-01-01 RX ORDER — POLYETHYLENE GLYCOL 3350 17 G/17G
17 POWDER, FOR SOLUTION ORAL DAILY PRN
Status: DISCONTINUED | OUTPATIENT
Start: 2020-01-01 | End: 2020-04-15 | Stop reason: HOSPADM

## 2020-01-01 RX ORDER — NITROGLYCERIN 0.3 MG/1
TABLET SUBLINGUAL
Qty: 100 TABLET | Refills: 0 | OUTPATIENT
Start: 2020-01-01

## 2020-01-01 RX ORDER — ACETAMINOPHEN 325 MG/1
650 TABLET ORAL EVERY 4 HOURS PRN
Status: DISCONTINUED | OUTPATIENT
Start: 2020-01-01 | End: 2020-04-15 | Stop reason: HOSPADM

## 2020-01-01 RX ORDER — ACETAMINOPHEN 160 MG/5ML
650 SOLUTION ORAL EVERY 4 HOURS PRN
Status: DISCONTINUED | OUTPATIENT
Start: 2020-01-01 | End: 2020-04-15 | Stop reason: HOSPADM

## 2020-01-01 RX ORDER — IPRATROPIUM BROMIDE AND ALBUTEROL SULFATE 2.5; .5 MG/3ML; MG/3ML
3 SOLUTION RESPIRATORY (INHALATION)
Status: DISCONTINUED | OUTPATIENT
Start: 2020-01-01 | End: 2020-04-15 | Stop reason: HOSPADM

## 2020-01-01 RX ORDER — ATENOLOL 25 MG/1
25 TABLET ORAL EVERY 12 HOURS SCHEDULED
Status: DISCONTINUED | OUTPATIENT
Start: 2020-01-01 | End: 2020-04-15 | Stop reason: HOSPADM

## 2020-01-01 RX ORDER — ACETAMINOPHEN 650 MG/1
650 SUPPOSITORY RECTAL EVERY 4 HOURS PRN
Status: DISCONTINUED | OUTPATIENT
Start: 2020-01-01 | End: 2020-04-15 | Stop reason: HOSPADM

## 2020-01-01 RX ORDER — NICOTINE POLACRILEX 4 MG
15 LOZENGE BUCCAL
Status: DISCONTINUED | OUTPATIENT
Start: 2020-01-01 | End: 2020-04-15 | Stop reason: HOSPADM

## 2020-01-01 RX ORDER — DEXTROSE MONOHYDRATE 25 G/50ML
25 INJECTION, SOLUTION INTRAVENOUS
Status: DISCONTINUED | OUTPATIENT
Start: 2020-01-01 | End: 2020-04-15 | Stop reason: HOSPADM

## 2020-01-01 RX ORDER — DIGOXIN 0.25 MG/ML
500 INJECTION INTRAMUSCULAR; INTRAVENOUS ONCE
Status: COMPLETED | OUTPATIENT
Start: 2020-01-01 | End: 2020-01-01

## 2020-01-01 RX ORDER — LISINOPRIL 40 MG/1
TABLET ORAL
Qty: 90 TABLET | Refills: 2 | OUTPATIENT
Start: 2020-01-01

## 2020-01-01 RX ORDER — LISINOPRIL 40 MG/1
40 TABLET ORAL DAILY
COMMUNITY

## 2020-01-01 RX ORDER — HYDROCODONE BITARTRATE AND ACETAMINOPHEN 7.5; 325 MG/1; MG/1
1 TABLET ORAL EVERY 4 HOURS PRN
Status: DISCONTINUED | OUTPATIENT
Start: 2020-01-01 | End: 2020-04-15 | Stop reason: HOSPADM

## 2020-01-01 RX ADMIN — VENLAFAXINE HYDROCHLORIDE 75 MG: 75 TABLET ORAL at 21:25

## 2020-01-01 RX ADMIN — AMIODARONE HYDROCHLORIDE 150 MG: 1.5 INJECTION, SOLUTION INTRAVENOUS at 03:36

## 2020-01-01 RX ADMIN — Medication 4 ML: at 12:26

## 2020-01-01 RX ADMIN — HYDROCODONE BITARTRATE AND ACETAMINOPHEN 1 TABLET: 7.5; 325 TABLET ORAL at 21:32

## 2020-01-01 RX ADMIN — ATORVASTATIN CALCIUM 20 MG: 20 TABLET, FILM COATED ORAL at 21:25

## 2020-01-01 RX ADMIN — VENLAFAXINE HYDROCHLORIDE 75 MG: 75 TABLET ORAL at 10:47

## 2020-01-01 RX ADMIN — INSULIN LISPRO 8 UNITS: 100 INJECTION, SOLUTION INTRAVENOUS; SUBCUTANEOUS at 12:06

## 2020-01-01 RX ADMIN — ENOXAPARIN SODIUM 140 MG: 80 INJECTION SUBCUTANEOUS at 16:02

## 2020-01-01 RX ADMIN — INSULIN LISPRO 7 UNITS: 100 INJECTION, SOLUTION INTRAVENOUS; SUBCUTANEOUS at 10:47

## 2020-01-01 RX ADMIN — ONDANSETRON 4 MG: 2 INJECTION INTRAMUSCULAR; INTRAVENOUS at 13:01

## 2020-01-01 RX ADMIN — CEFEPIME HYDROCHLORIDE 2 G: 2 INJECTION, POWDER, FOR SOLUTION INTRAVENOUS at 13:56

## 2020-01-01 RX ADMIN — METOPROLOL TARTRATE 25 MG: 25 TABLET ORAL at 12:50

## 2020-01-01 RX ADMIN — METOPROLOL TARTRATE 25 MG: 25 TABLET ORAL at 10:47

## 2020-01-01 RX ADMIN — IOPAMIDOL 95 ML: 755 INJECTION, SOLUTION INTRAVENOUS at 14:47

## 2020-01-01 RX ADMIN — IPRATROPIUM BROMIDE AND ALBUTEROL SULFATE 3 ML: 2.5; .5 SOLUTION RESPIRATORY (INHALATION) at 15:40

## 2020-01-01 RX ADMIN — INSULIN LISPRO 7 UNITS: 100 INJECTION, SOLUTION INTRAVENOUS; SUBCUTANEOUS at 21:25

## 2020-01-01 RX ADMIN — SODIUM CHLORIDE, PRESERVATIVE FREE 10 ML: 5 INJECTION INTRAVENOUS at 10:47

## 2020-01-01 RX ADMIN — SODIUM CHLORIDE 500 ML: 9 INJECTION, SOLUTION INTRAVENOUS at 14:15

## 2020-01-01 RX ADMIN — DOCUSATE SODIUM -SENNOSIDES 1 TABLET: 50; 8.6 TABLET, COATED ORAL at 11:24

## 2020-01-01 RX ADMIN — AMIODARONE HYDROCHLORIDE 0.5 MG/MIN: 1.8 INJECTION, SOLUTION INTRAVENOUS at 10:46

## 2020-01-01 RX ADMIN — ENOXAPARIN SODIUM 140 MG: 150 INJECTION SUBCUTANEOUS at 05:01

## 2020-01-01 RX ADMIN — INSULIN GLARGINE 56 UNITS: 100 INJECTION, SOLUTION SUBCUTANEOUS at 21:25

## 2020-01-01 RX ADMIN — IPRATROPIUM BROMIDE AND ALBUTEROL SULFATE 3 ML: 2.5; .5 SOLUTION RESPIRATORY (INHALATION) at 12:21

## 2020-01-01 RX ADMIN — IPRATROPIUM BROMIDE AND ALBUTEROL SULFATE 3 ML: 2.5; .5 SOLUTION RESPIRATORY (INHALATION) at 12:51

## 2020-01-01 RX ADMIN — VANCOMYCIN HYDROCHLORIDE 2750 MG: 10 INJECTION, POWDER, LYOPHILIZED, FOR SOLUTION INTRAVENOUS at 16:01

## 2020-01-01 RX ADMIN — DIGOXIN 500 MCG: 0.25 INJECTION INTRAMUSCULAR; INTRAVENOUS at 14:39

## 2020-01-01 RX ADMIN — INSULIN GLARGINE 56 UNITS: 100 INJECTION, SOLUTION SUBCUTANEOUS at 10:42

## 2020-01-01 RX ADMIN — EPINEPHRINE 1 MG: 0.1 INJECTION, SOLUTION ENDOTRACHEAL; INTRACARDIAC; INTRAVENOUS at 17:12

## 2020-01-01 RX ADMIN — ATENOLOL 25 MG: 25 TABLET ORAL at 13:01

## 2020-01-01 RX ADMIN — ASPIRIN 325 MG: 325 TABLET ORAL at 10:47

## 2020-01-01 RX ADMIN — AMIODARONE HYDROCHLORIDE 1 MG/MIN: 1.8 INJECTION, SOLUTION INTRAVENOUS at 03:51

## 2020-01-01 RX ADMIN — EPINEPHRINE 1 MG: 0.1 INJECTION, SOLUTION ENDOTRACHEAL; INTRACARDIAC; INTRAVENOUS at 17:15

## 2020-01-01 RX ADMIN — SODIUM BICARBONATE 50 MEQ: 84 INJECTION, SOLUTION INTRAVENOUS at 17:10

## 2020-04-13 PROBLEM — J18.9 PNEUMONIA: Status: ACTIVE | Noted: 2020-01-01

## 2020-04-13 PROBLEM — N17.9 AKI (ACUTE KIDNEY INJURY) (HCC): Status: ACTIVE | Noted: 2020-01-01

## 2020-04-13 PROBLEM — E11.9 DIABETES MELLITUS (HCC): Status: ACTIVE | Noted: 2020-01-01

## 2020-04-13 PROBLEM — A41.9 SEPSIS (HCC): Status: ACTIVE | Noted: 2020-01-01

## 2020-04-13 PROBLEM — N18.9 CKD (CHRONIC KIDNEY DISEASE): Status: ACTIVE | Noted: 2020-01-01

## 2020-04-13 PROBLEM — S82.92XA FRACTURE OF LEFT LEG: Status: ACTIVE | Noted: 2020-01-01

## 2020-04-13 PROBLEM — R68.84 JAW PAIN: Status: ACTIVE | Noted: 2020-01-01

## 2020-04-13 PROBLEM — I26.99 PULMONARY EMBOLUS (HCC): Status: ACTIVE | Noted: 2020-01-01

## 2020-04-13 NOTE — ED NOTES
Antibiotics ordered by MD. Antibiotics cannot be started at this time. Pt received duo neb treatment and is COVID-19 rule out. This RN will start antibiotics when room is clear to enter.     Katharine Roberts, SABRINA  04/13/20 3058    chad FAULKNER notified     Armando, Katharine, RN  04/13/20 1701

## 2020-04-13 NOTE — ED TRIAGE NOTES
Pt to ER for SOA (pt high %80's on room air, %95 on 3L nc), cough, and fever x3 days, rule out covid from [facility]. Pt has no known exposure. Upon triage pt arrived in mask, this RN appropriate PPE.  collazo RUN [id number].

## 2020-04-13 NOTE — PROGRESS NOTES
04/13/20  19:44    I am the on-call nurse practitioner for [place] [facility].  Received a call from cardiology as patient is COVID 19 rule out and has order for EKG.  I have reviewed patient's medical record and he has an EKG that was performed today at 12: 30 which revealed atrial fibrillation with rapid ventricular rate.  Patient is admitted for sepsis and also has pulmonary embolism.  EKG was ordered by rapid response team for complaint of jaw pain.  Upon review of patient's medical record patient was also complaining of jaw pain in the emergency department.  Only other complaint is shortness of breath which was present during emergency department evaluation.  I have spoken with the.  Since nurse and patient does not have any complaints of chest pain.  At this time I am going to cancel the repeat EKG as patient's bedside monitor has not changed other than now being in rate controlled atrial fibrillation.  I have notified the EKG tech that this has been canceled.    SULAIMAN Moya  [facility]

## 2020-04-13 NOTE — CODE DOCUMENTATION
Spoke with Dr. Macias, pt to remain on unit and Dr. Donnelly to see patient and decide about level of care/plan of care.

## 2020-04-13 NOTE — ED PROVIDER NOTES
EMERGENCY DEPARTMENT ENCOUNTER    CHIEF COMPLAINT  Chief Complaint: soa  History given by: patient  History limited by: none  Room Number: S607/1  PMD: Provider, No Known      HPI:  Pt is a 65 y.o. male who presents complaining of this of breath and a dry cough for the past 2 days.  He states that he is at [facility] recovering from a fractured left leg when he developed a dry cough, sore throat and shortness of breath for 2 days.  He has not noticed any palpitations or chest pain he says his jaw is sore.  He does admit to nausea but denies headache, vomiting, fever, myalgias or arthralgias.  Has no known exposure to COVID-19 but he has been recently hospitalized.  Denies any history of atrial fibrillation.  He admits to sweating for the past couple days but he states that is normal for him.  He states the shortness of breath is worsened with any type of exertion and nothing completely relieves it.  He has noticed some wheezing but he does not have a history of wheezing.    Patient was placed in face mask in first look. Patient was wearing facemask when I entered the room and throughout our encounter. I wore full protective equipment throughout this patient encounter including a face mask, eye shield, gown and gloves. Hand hygiene was performed before donning protective equipment and after removal when leaving the room.        PAST MEDICAL HISTORY  Active Ambulatory Problems     Diagnosis Date Noted   • No Active Ambulatory Problems     Resolved Ambulatory Problems     Diagnosis Date Noted   • No Resolved Ambulatory Problems     Past Medical History:   Diagnosis Date   • Diabetes mellitus (CMS/HCC)    • Hyperlipidemia    • Hypertension    • Major depressive disorder        PAST SURGICAL HISTORY  History reviewed. No pertinent surgical history.    FAMILY HISTORY  History reviewed. No pertinent family history.    SOCIAL HISTORY  Social History     Socioeconomic History   • Marital status: Single     Spouse name:  Not on file   • Number of children: Not on file   • Years of education: Not on file   • Highest education level: Not on file   Tobacco Use   • Smoking status: Never Smoker   Substance and Sexual Activity   • Alcohol use: Not Currently   • Drug use: Never   • Sexual activity: Defer       ALLERGIES  Patient has no known allergies.    REVIEW OF SYSTEMS  Review of Systems   Constitutional: Positive for diaphoresis. Negative for appetite change, chills and fever.   HENT: Negative for congestion and sore throat.    Eyes: Negative.    Respiratory: Positive for cough, shortness of breath and wheezing.    Cardiovascular: Negative for chest pain and leg swelling.   Gastrointestinal: Negative for abdominal distention, abdominal pain, diarrhea, nausea and vomiting.   Endocrine: Negative.    Genitourinary: Negative for decreased urine volume and dysuria.   Musculoskeletal: Negative for neck pain.   Skin: Negative for rash and wound.   Allergic/Immunologic: Negative.    Neurological: Negative for weakness, numbness and headaches.   Hematological: Negative.    Psychiatric/Behavioral: Negative.  Negative for agitation.   All other systems reviewed and are negative.      PHYSICAL EXAM  ED Triage Vitals [04/13/20 1156]   Temp Heart Rate Resp BP SpO2   99.4 °F (37.4 °C) 115 25 135/67 95 %      Temp src Heart Rate Source Patient Position BP Location FiO2 (%)   -- -- -- -- --       Physical Exam   Constitutional: He is oriented to person, place, and time. He appears distressed (mild).   HENT:   Head: Normocephalic.   Eyes: EOM are normal. Right eye exhibits no discharge. Left eye exhibits no discharge.   Neck: Normal range of motion. Neck supple.   Cardiovascular: Normal rate, normal heart sounds and normal pulses. An irregularly irregular rhythm present.   Pulmonary/Chest: He is in respiratory distress (mild). He has wheezes (diffuse). He has rales (scattered). He exhibits no tenderness.   Abdominal: Soft. Bowel sounds are normal. He  exhibits no distension. There is no tenderness. There is no guarding.   Musculoskeletal: Normal range of motion.   Left leg is in a posterior splint.  Denies tenderness to his left thigh.  There is no edema.   Lymphadenopathy:     He has no cervical adenopathy.   Neurological: He is alert and oriented to person, place, and time.   Skin: Skin is warm. He is diaphoretic.       LAB RESULTS  Lab Results (last 24 hours)     Procedure Component Value Units Date/Time    CBC & Differential [[id number]] Collected:  04/13/20 1236    Specimen:  Blood Updated:  04/13/20 1250    Narrative:       The following orders were created for panel order CBC & Differential.  Procedure                               Abnormality         Status                     ---------                               -----------         ------                     CBC Auto Differential[[id number]]        Abnormal            Final result                 Please view results for these tests on the individual orders.    Comprehensive Metabolic Panel [[id number]]  (Abnormal) Collected:  04/13/20 1236    Specimen:  Blood Updated:  04/13/20 1314     Glucose 346 mg/dL      BUN 35 mg/dL      Creatinine 1.42 mg/dL      Sodium 129 mmol/L      Potassium 4.9 mmol/L      Chloride 91 mmol/L      CO2 20.1 mmol/L      Calcium 9.5 mg/dL      Total Protein 6.4 g/dL      Albumin 3.00 g/dL      ALT (SGPT) 31 U/L      AST (SGOT) 24 U/L      Alkaline Phosphatase 107 U/L      Total Bilirubin 1.9 mg/dL      eGFR Non African Amer 50 mL/min/1.73      Globulin 3.4 gm/dL      A/G Ratio 0.9 g/dL      BUN/Creatinine Ratio 24.6     Anion Gap 17.9 mmol/L     Narrative:       GFR Normal >60  Chronic Kidney Disease <60  Kidney Failure <15      Lactate Dehydrogenase [[id number]]  (Abnormal) Collected:  04/13/20 1236    Specimen:  Blood Updated:  04/13/20 1317      U/L      Comment: Specimen hemolyzed.  Results may be affected.       Procalcitonin [[id number]]  (Abnormal) Collected:   "04/13/20 1236    Specimen:  Blood Updated:  04/13/20 1321     Procalcitonin 1.28 ng/mL     Narrative:       As a Marker for Sepsis (Non-Neonates):   1. <0.5 ng/mL represents a low risk of severe sepsis and/or septic shock.  1. >2 ng/mL represents a high risk of severe sepsis and/or septic shock.    As a Marker for Lower Respiratory Tract Infections that require antibiotic therapy:  PCT on Admission     Antibiotic Therapy             6-12 Hrs later  > 0.5                Strongly Recommended            >0.25 - <0.5         Recommended  0.1 - 0.25           Discouraged                   Remeasure/reassess PCT  <0.1                 Strongly Discouraged          Remeasure/reassess PCT      As 28 day mortality risk marker: \"Change in Procalcitonin Result\" (> 80 % or <=80 %) if Day 0 (or Day 1) and Day 4 values are available. Refer to http://www.Iotumpct-calculator.com/   Change in PCT <=80 %   A decrease of PCT levels below or equal to 80 % defines a positive change in PCT test result representing a higher risk for 28-day all-cause mortality of patients diagnosed with severe sepsis or septic shock.  Change in PCT > 80 %   A decrease of PCT levels of more than 80 % defines a negative change in PCT result representing a lower risk for 28-day all-cause mortality of patients diagnosed with severe sepsis or septic shock.                Results may be falsely decreased if patient taking Biotin.     C-reactive Protein [[id number]]  (Abnormal) Collected:  04/13/20 1236    Specimen:  Blood Updated:  04/13/20 1314     C-Reactive Protein 33.99 mg/dL     Lactic Acid, Plasma [[id number]]  (Abnormal) Collected:  04/13/20 1236    Specimen:  Blood Updated:  04/13/20 1305     Lactate 2.9 mmol/L     Ferritin [[id number]]  (Abnormal) Collected:  04/13/20 1236    Specimen:  Blood Updated:  04/13/20 1320     Ferritin 793.00 ng/mL     Narrative:       Results may be falsely decreased if patient taking Biotin.      SARS-CoV-2, PCR (IN-HOUSE), NP " Swab in Transport Media - Swab, Nasopharynx [[id number]]  (Normal) Collected:  04/13/20 1236    Specimen:  Swab from Nasopharynx Updated:  04/13/20 1423     COVID19 Not Detected    Blood Culture - Blood, Arm, Left [[id number]] Collected:  04/13/20 1236    Specimen:  Blood from Arm, Left Updated:  04/13/20 1243    CBC Auto Differential [[id number]]  (Abnormal) Collected:  04/13/20 1236    Specimen:  Blood Updated:  04/13/20 1250     WBC 22.44 10*3/mm3      RBC 4.76 10*6/mm3      Hemoglobin 14.4 g/dL      Hematocrit 42.1 %      MCV 88.4 fL      MCH 30.3 pg      MCHC 34.2 g/dL      RDW 12.8 %      RDW-SD 41.2 fl      MPV 10.4 fL      Platelets 247 10*3/mm3      Neutrophil % 92.3 %      Lymphocyte % 1.5 %      Monocyte % 5.3 %      Eosinophil % 0.0 %      Basophil % 0.1 %      Immature Grans % 0.8 %      Neutrophils, Absolute 20.71 10*3/mm3      Lymphocytes, Absolute 0.33 10*3/mm3      Monocytes, Absolute 1.18 10*3/mm3      Eosinophils, Absolute 0.01 10*3/mm3      Basophils, Absolute 0.03 10*3/mm3      Immature Grans, Absolute 0.18 10*3/mm3      nRBC 0.0 /100 WBC     Troponin [[id number]]  (Normal) Collected:  04/13/20 1236    Specimen:  Blood Updated:  04/13/20 1320     Troponin T 0.019 ng/mL     Narrative:       Troponin T Reference Range:  <= 0.03 ng/mL-   Negative for AMI  >0.03 ng/mL-     Abnormal for myocardial necrosis.  Clinicians would have to utilize clinical acumen, EKG, Troponin and serial changes to determine if it is an Acute Myocardial Infarction or myocardial injury due to an underlying chronic condition.       Results may be falsely decreased if patient taking Biotin.      Magnesium [[id number]]  (Abnormal) Collected:  04/13/20 1236    Specimen:  Blood Updated:  04/13/20 1314     Magnesium 1.4 mg/dL     Lactic Acid, Reflex Timer (This will reflex a repeat order 3-3:15 hours after ordered.) [[id number]] Collected:  04/13/20 1236    Specimen:  Blood Updated:  04/13/20 1612     Hold Tube Hold for add-ons.      Comment: Auto resulted.       Blood Culture - Blood, Arm, Left [[id number]] Collected:  04/13/20 1248    Specimen:  Blood from Arm, Left Updated:  04/13/20 1254    D-dimer, Quantitative [[id number]]  (Abnormal) Collected:  04/13/20 1258    Specimen:  Blood Updated:  04/13/20 1338     D-Dimer, Quantitative 5.41 MCGFEU/mL     Narrative:       The Stago D-Dimer test used in conjunction with a clinical pretest probability (PTP) assessment model, has been approved by the FDA to rule out the presence of venous thromboembolism (VTE) in outpatients suspected of deep venous thrombosis (DVT) or pulmonary embolism (PE). The cut-off for negative predictive value is <0.50 MCGFEU/mL.    Lactic Acid, Reflex [[id number]]  (Abnormal) Collected:  04/13/20 1730    Specimen:  Blood Updated:  04/13/20 1755     Lactate 3.8 mmol/L           I ordered the above labs and reviewed the results    RADIOLOGY  CT Angiogram Chest   Final Result      XR Chest AP   Final Result   Prominence of the zara bilaterally. This may be related to   perihilar atelectasis or scarring, though perihilar infiltrates are   possible. No peripheral infiltrate is demonstrated and there is no   finding specific for COVID-19. No pulmonary edema or pleural effusion.   Follow up PA and lateral chest may be helpful.       This report was finalized on 4/13/2020 1:24 PM by Dr. Venu Hilario M.D.               I ordered the above noted radiological studies. Interpreted by radiologist. Reviewed by me in PACS.       PROCEDURES  Critical Care  Performed by: Steve Torres MD  Authorized by: Steve Torres MD     Critical care provider statement:     Critical care time (minutes):  45    Critical care was necessary to treat or prevent imminent or life-threatening deterioration of the following conditions:  Respiratory failure    Critical care was time spent personally by me on the following activities:  Discussions with primary provider, evaluation of patient's response  to treatment, examination of patient, interpretation of cardiac output measurements, obtaining history from patient or surrogate, ordering and performing treatments and interventions, ordering and review of laboratory studies, ordering and review of radiographic studies, pulse oximetry and re-evaluation of patient's condition      EKG          EKG time: 1230  Rhythm/Rate: Atrial fibrillation, rate 120  P waves and NV: Patient with PVC  QRS, axis: Normal  ST and T waves: Normal    Interpreted Contemporaneously by me, independently viewed  No previous EKG      PROGRESS AND CONSULTS  ED Course as of Apr 13 1845   Mon Apr 13, 2020   1314 Patient has had a neb nebulizer treatment.  He is in a negative pressure room but we have to wait 30 minutes before anyone can go back into his room.  Antibiotics may end up being delayed secondary to during the air from possible exposure to COVID-19.    [DE]   1413 Patient's blood pressure went down to 97/60.  We will give IV fluids.  We do not have the results of his COVID-19 test.  We will not give the typical IV fluid bolus I am concerned that patient may actually have COVID-19.  His oxygen saturation is 97% on 3 L nasal cannula.    [DE]   1414 Lactate(!!): 2.9 [DE]   1415 C-Reactive Protein(!): 33.99 [DE]   1415 Ferritin(!): 793.00 [DE]   1415 LDH(!): 243 [DE]   1415 Procalcitonin(!): 1.28 [DE]   1428 Patient is COVID-19 negative by nasal swab.  CTA of the chest has been ordered.    [DE]   1529 Discussed the case with Dr. Le, radiology.  Patient has a small pulmonary embolus in the left lower lobe.  His RV to LV ratio is 1.  He also has left lower lobe bronchitis and a small parabronchial infiltrate in the left lower lobe.  He has right lower lobe atelectasis.  He is already receiving IV antibiotics we will add anticoagulation and get him admitted to the hospital.    [DE]   1536 I discussed the case with Dr. Donnelly with [facility].  He is going admit the patient to the hospital for  further evaluation and treatment.    [DE]   3099 I updated patient the results of blood work and CAT scan and his need for admission.  Answered all his questions and addressed all of his concerns.    [DE]      ED Course User Index  [DE] Steve Torres MD         MEDICAL DECISION MAKING  Results were reviewed/discussed with the patient and they were also made aware of online access. Pt also made aware that some labs, such as cultures, will not be resulted during ER visit and follow up with PMD is necessary.     MDM  Number of Diagnoses or Management Options  Bronchitis:   Other acute pulmonary embolism without acute cor pulmonale (CMS/HCC):   Pneumonia of left lower lobe due to infectious organism (CMS/HCC):   Sepsis without acute organ dysfunction, due to unspecified organism (CMS/HCC):      Amount and/or Complexity of Data Reviewed  Clinical lab tests: reviewed  Tests in the radiology section of CPT®: reviewed and ordered (CTA chest -small pulmonary embolus in the left lower lobe.  The RV to LV ratio is 1.  Patient has right lower lobe atelectasis and left lower lobe bronchitis and infiltrate per Dr. Le.)  Tests in the medicine section of CPT®: reviewed    Patient Progress  Patient progress: improved         DIAGNOSIS  Final diagnoses:   Other acute pulmonary embolism without acute cor pulmonale (CMS/HCC)   Pneumonia of left lower lobe due to infectious organism (CMS/HCC)   Bronchitis   Sepsis without acute organ dysfunction, due to unspecified organism (CMS/HCC)   Suspected Covid-19 Virus Infection       DISPOSITION  ADMISSION    Discussed treatment plan and reason for admission with pt/family and admitting physician.  Pt/family voiced understanding of the plan for admission for further testing/treatment as needed.         Latest Documented Vital Signs:  As of 18:45  BP- 141/92 HR- 101 Temp- 97.5 °F (36.4 °C) (Oral) O2 sat- 91%    --  Dragon disclaimer:   Much of this encounter note is an electronic  transcription/translation of spoken language to printed text.  The electronic translation of spoken language may permit erroneous, or at times, nonsensical words or phrases to be inadvertently transcribed.  Although I have reviewed the note for such areas, some may still exist.       Steve Torres MD  04/13/20 4212

## 2020-04-13 NOTE — H&P
Internal medicine history and physical  INTERNAL MEDICINE   [facility]       Patient Identification:  Name: Shakeel Cortez  Age: 65 y.o.  Sex: male  :  1955  MRN: [id number]                   Primary Care Physician: Provider, No Known                               Date of admission:2020    Chief Complaint: Presented from rehabilitation facility for progressive shortness of breath and dry cough for the last 2 days.    History of Present Illness:   Patient is a 65-year-old male with past medical history as noted below fracture his left leg about a month ago and was subsequently placed at the rehab facility.  According to the patient he was doing fine until 2 days ago when he started having dry cough associated with shortness of breath.  He was also complaining of some discomfort in his jaw.  He gets episodically out of breath and activity further makes him short of breath.  He denies any exposure to sick contact.  Work-up in the emergency room revealed atrial fibrillation with rapid ventricular response along with leukocytosis and elevated blood sugar.  Patient was noted to have leukocytosis and CT scan showed right-sided lung infiltrate and bronchitis type pattern concerning for pneumonia.  His initial ABG showed hypoxia with compensated pH of 7.397.  Patient is admitted for further care.      Past Medical History:  Past Medical History:   Diagnosis Date   • Diabetes mellitus (CMS/[facility])    • Hyperlipidemia    • Hypertension    • Major depressive disorder      Past Surgical History:  History reviewed. No pertinent surgical history.   Home Meds:  Medications Prior to Admission   Medication Sig Dispense Refill Last Dose   • aspirin 325 MG tablet Take 325 mg by mouth Daily.   2020 at 0800   • HYDROcodone-acetaminophen (NORCO) 7.5-325 MG per tablet Take 1 tablet by mouth Every 4 (Four) Hours As Needed for Moderate Pain .   2020 at 0800   • insulin glargine (LANTUS) 100 UNIT/ML  injection Inject 56 Units under the skin into the appropriate area as directed 2 (Two) Times a Day.   4/11/2020 at 1940   • lisinopril (PRINIVIL,ZESTRIL) 40 MG tablet Take 40 mg by mouth Daily.   4/13/2020 at 0800   • metFORMIN (GLUCOPHAGE) 1000 MG tablet Take 1,000 mg by mouth 2 (Two) Times a Day With Meals.   4/13/2020 at 0800   • metoprolol tartrate (LOPRESSOR) 25 MG tablet Take 25 mg by mouth 2 (Two) Times a Day.   4/13/2020 at 0800   • mupirocin (BACTROBAN) 2 % ointment Apply  topically to the appropriate area as directed Daily.      • sennosides-docusate (senna-docusate sodium) 8.6-50 MG per tablet Take 1 tablet by mouth Daily.   4/13/2020 at 0800   • simvastatin (ZOCOR) 40 MG tablet Take 40 mg by mouth Every Night.   4/10/2020 at 2025   • venlafaxine (EFFEXOR) 75 MG tablet Take 75 mg by mouth 2 (Two) Times a Day.   4/13/2020 at 0800   • acetaminophen (TYLENOL) 325 MG tablet Take 650 mg by mouth Every 4 (Four) Hours As Needed for Mild Pain .   Unknown at Unknown time   • ondansetron (ZOFRAN) 4 MG tablet Take 4 mg by mouth Every 8 (Eight) Hours As Needed for Nausea or Vomiting.   Unknown at Unknown time   • polyethylene glycol (MIRALAX) packet Take 17 g by mouth Daily As Needed.   Unknown at Unknown time     Current Meds:   No current facility-administered medications for this encounter.   Allergies:  No Known Allergies  Social History:   Social History     Tobacco Use   • Smoking status: Never Smoker   Substance Use Topics   • Alcohol use: Not Currently      Family History:  History reviewed. No pertinent family history.       Review of Systems  See history of present illness and past medical history.   Constitutional: Positive for diaphoresis. Negative for appetite change, chills and fever.   HENT: Negative for congestion and sore throat.    Eyes: Negative.    Respiratory: Positive for cough, shortness of breath and wheezing.    Cardiovascular: Negative for chest pain and leg swelling.   Gastrointestinal:  "Negative for abdominal distention, abdominal pain, diarrhea, nausea and vomiting.   Endocrine: Negative.    Genitourinary: Negative for decreased urine volume and dysuria.   Musculoskeletal: Negative for neck pain.   Skin: Negative for rash and wound.   Allergic/Immunologic: Negative.    Neurological: Negative for weakness, numbness and headaches.   Hematological: Negative.    Psychiatric/Behavioral: Negative.  Negative for agitation.   All other systems reviewed and are negative.  Remainder of ROS is negative.      Vitals:   /82 (BP Location: Right arm)   Pulse 91   Temp 97.5 °F (36.4 °C) (Oral)   Resp (!) 36   Ht 195.6 cm (77\")   Wt (!) 138 kg (305 lb)   SpO2 92%   BMI 36.17 kg/m²   I/O:     Intake/Output Summary (Last 24 hours) at 4/13/2020 1944  Last data filed at 4/13/2020 1729  Gross per 24 hour   Intake 600 ml   Output --   Net 600 ml     Exam:  Patient is examined using the personal protective equipment as per guidelines from infection control for this particular patient as enacted.  Hand washing was performed before and after patient interaction.  General Appearance:   Alert cooperative male who is tachycardiac.   Head:    Normocephalic, without obvious abnormality, atraumatic   Eyes:    PERRL, conjunctiva/corneas clear, EOM's intact, both eyes   Ears:    Normal external ear canals, both ears   Nose:   Nares normal, septum midline, mucosa normal, no drainage    or sinus tenderness   Throat:   Lips, tongue, gums normal; oral mucosa pink and moist   Neck:   Supple, symmetrical, trachea midline, no adenopathy;     thyroid:  no enlargement/tenderness/nodules; no carotid    bruit or JVD   Back:     Symmetric, no curvature, ROM normal, no CVA tenderness   Lungs:    Occasional rhonchi   Chest Wall:    No tenderness or deformity    Heart:    Regular rate and rhythm, S1 and S2 normal, no murmur, rub   or gallop   Abdomen:     Soft, non-tender, bowel sounds active all four quadrants,     no masses, no " hepatomegaly, no splenomegaly   Extremities:  Left lower extremity is in a splint   Pulses:   Pulses palpable in all extremities; symmetric all extremities   Skin:   Skin color normal, Skin is warm and dry,  no rashes or palpable lesions   Neurologic:  Grossly nonfocal       Data Review:      I reviewed the patient's new clinical results.  Results from last 7 days   Lab Units 04/13/20  1236   WBC 10*3/mm3 22.44*   HEMOGLOBIN g/dL 14.4   PLATELETS 10*3/mm3 247     Results from last 7 days   Lab Units 04/13/20  1236   SODIUM mmol/L 129*   POTASSIUM mmol/L 4.9   CHLORIDE mmol/L 91*   CO2 mmol/L 20.1*   BUN mg/dL 35*   CREATININE mg/dL 1.42*   CALCIUM mg/dL 9.5   GLUCOSE mg/dL 346*     Microbiology Results (last 10 days)     Procedure Component Value - Date/Time    SARS-CoV-2, PCR (IN-HOUSE), NP Swab in Transport Media - Swab, Nasopharynx [[id number]]  (Normal) Collected:  04/13/20 1236    Lab Status:  Final result Specimen:  Swab from Nasopharynx Updated:  04/13/20 1423     COVID19 Not Detected           ECG 12 Lead   Final Result   HEART RATE= 120  bpm   RR Interval= 498  ms   MT Interval=   ms   P Horizontal Axis=   deg   P Front Axis=   deg   QRSD Interval= 94  ms   QT Interval= 322  ms   QRS Axis= 27  deg   T Wave Axis= 72  deg   - ABNORMAL ECG -   Atrial fibrillation   NO PRIOR TRACING AVAILABLE FOR COMPARISON   Aberrant complex   Electronically Signed By: Alexey Molina ([facility]) 13-Apr-2020 14:11:55   Date and Time of Study: 2020-04-13 12:30:12            Assessment:  Active Hospital Problems    Diagnosis POA   • **Pneumonia [J18.9] Unknown   • Pulmonary embolus (CMS/HCC) [I26.99] Yes   • Diabetes mellitus (CMS/HCC) [E11.9] Unknown   • CATHERINE (acute kidney injury) (CMS/HCC) [N17.9] Unknown   • CKD (chronic kidney disease) [N18.9] Unknown   • Fracture of left leg [S82.92XA] Unknown   • Sepsis (CMS/HCC) [A41.9] Unknown   • Jaw pain [R68.84] Unknown       Medical decision making:  Acute pulmonary thromboembolism likely due to  recent sedentary status after leg fracture explaining dyspnea on exertion as well as at rest tachycardia and possible new onset atrial fibrillation and chest pain.  Plan is to admit the patient start him on Lovenox oxygen supplementation and pulmonary consultation.  Check venous Doppler of the lower extremities.  Sepsis with leukocytosis and elevated lactic acid level likely secondary to right lower lobe pneumonia-continue with fluids and IV antibiotic therapy.  Atrial fibrillation with rapid ventricular response associated with chest pain/jaw pain initial troponin negative plan is to continue with serial troponin and  cardiology evaluation.  Rapid response was called when patient arrived on the floor.  Chronic kidney disease with underlying diabetes and recent use of contrast plan is to monitor renal function avoid hypotensive episodes and nephrotoxic agents.  Diabetes mellitus poorly controlled-hold metformin based regimen provide him with insulin and Accu-Cheks and sliding scale coverage and watch for hypoglycemia.  Patient currently does not exhibit any evidence of DKA.  Fracture of the left leg plan is to get more information about the circumstances leading to fracture.  Follow-up is that he has so that when he leaves the hospital proper disposition is carried out.      Jag Donnelly MD   4/13/2020  19:44  Much of this encounter note is an electronic transcription/translation of spoken language to printed text. The electronic translation of spoken language may permit erroneous, or at times, nonsensical words or phrases to be inadvertently transcribed; Although I have reviewed the note for such errors, some may still exist

## 2020-04-14 PROBLEM — E11.65 DIABETES MELLITUS WITH HYPERGLYCEMIA (HCC): Status: ACTIVE | Noted: 2020-01-01

## 2020-04-14 PROBLEM — E80.6 HYPERBILIRUBINEMIA: Status: ACTIVE | Noted: 2020-01-01

## 2020-04-14 PROBLEM — I48.91 ATRIAL FIBRILLATION WITH RVR (HCC): Status: ACTIVE | Noted: 2020-01-01

## 2020-04-14 PROBLEM — E78.1 HYPERTRIGLYCERIDEMIA: Status: ACTIVE | Noted: 2020-01-01

## 2020-04-14 PROBLEM — E87.1 HYPONATREMIA: Status: ACTIVE | Noted: 2020-01-01

## 2020-04-14 PROBLEM — I82.401 ACUTE DEEP VEIN THROMBOSIS (DVT) OF RIGHT LOWER EXTREMITY (HCC): Status: ACTIVE | Noted: 2020-01-01

## 2020-04-14 NOTE — CODE DOCUMENTATION
Spoke with Dr. Macias, ABGs stable and optiflow adjusted. HR maintaining in 120s and BP stable after amio bolus, amio gtt infusing. Pt to remain on unit Per Dr. Macias.

## 2020-04-14 NOTE — SIGNIFICANT NOTE
Upon entering room patient noted to have RR at 40, BP 97/86, , and O2 high 70s to low 80s on optiflow. Patient alert to self, confused, cool, and clammy. RRT called at 1619 and call placed to Dr. Medrano. Call returned and MD notified of patient status. MD stated wanting to intubate and transfer to ICU. RRT taken over patient care at this time.

## 2020-04-14 NOTE — PROCEDURES
"Intubation  Date/Time: 4/14/2020 5:30 PM  Performed by: Raúl Medrano MD  Authorized by: Raúl Medrano MD   Consent: The procedure was performed in an emergent situation.  Relevant documents: relevant documents present and verified  Test results: test results available and properly labeled  Site marked: the operative site was marked  Imaging studies: imaging studies available  Required items: required blood products, implants, devices, and special equipment available  Patient identity confirmed: arm band and hospital-assigned identification number  Time out: Immediately prior to procedure a \"time out\" was called to verify the correct patient, procedure, equipment, support staff and site/side marked as required.  Indications: hypoxemia,  airway protection and  respiratory failure  Intubation method: video-assisted  Patient status: unconscious  Preoxygenation: nonrebreather mask  Pretreatment medications: none  Sedatives: propofol  Paralytic: none  Laryngoscope size: Mac 4  Tube size: 7.5 mm  Tube type: cuffed  Number of attempts: 1  Cricoid pressure: no  Cords visualized: yes  Post-procedure assessment: chest rise  Breath sounds: equal and absent over the epigastrium  Cuff inflated: yes  ETT to lip: 24 cm  Tube secured with: umbilical tape  Chest x-ray interpreted by radiologist.  Patient tolerance: Patient tolerated the procedure well with no immediate complications        "

## 2020-04-14 NOTE — CODE DOCUMENTATION
Rapid response called for RR 32 unable to  sat coarse Rhonci throught.  Dr HA on phone spoke with primary RN plan to move to CCU and intubate asap.  Floor staff taking over care to move with RT.  RTT to another Rapid same floor.  CG

## 2020-04-14 NOTE — PROGRESS NOTES
BLE Venous doppler study complete. Preliminary RLE is positive for DVT and LLE is positive for chronic DVT report given to Delicia BENNETT

## 2020-04-14 NOTE — SIGNIFICANT NOTE
ATTEMPTED TO PUT PATIENT ON AVAPS AS ORDERED, PT REPEATEDLY TRIED TO PULL MASK OFF AND WAS NOT TOLERATING IT. RR INCREASED TO 55 AND RESTLESSNESS BECAME SIGNIFICANTLY WORSE. RN CALLED DR KAYVOL HE WAS NOTIFIED THAT PATIENT DID NOT TOLERATE AND WAS PLACED BACK ON OPTIFLOW AS BEFORE.

## 2020-04-14 NOTE — PROGRESS NOTES
"    Name: Shakeel Cortez ADMIT: 2020   : 1955  PCP: Provider, No Known    MRN: [id number] LOS: 1 days   AGE/SEX: 65 y.o. male  ROOM: [facility]     Subjective   Subjective    Patient answering all questions appropriately -- voices no concerns at this time; despite RN reports of respiratory distress throughout night requiring 2 rapid responses with subsequent Amio gtt for treatment of AFIB w/ RVR -- heart rate currently 136 w/ cardiology aware / following.    Patient denies oxygen requirements at home; however, requires Optiflow 60 (L/min) at this time to maintain oxygen saturations > 90%.      Patient does report increased thirst with decreased output & last BM approximately \"3 weeks\" ago without abdominal pain.    Reportedly lives with his mother; however, recent stay at rehab facility -- he is unsure of rehab facility name ([facility]).  Patient requesting full CPR on this admission.     Review of Systems   Constitutional: Negative for chills and fever.   HENT: Negative for congestion and rhinorrhea.    Respiratory: Positive for cough. Negative for shortness of breath (denies SOB; however, noted reported rapid response x2 through night 2/ '\"respiratory distress\" per RN).    Cardiovascular: Negative for leg swelling.   Gastrointestinal: Positive for constipation (reported last BM \"3 weeks\" ago) and nausea. Negative for abdominal distention, abdominal pain, diarrhea and vomiting.   Endocrine: Positive for polydipsia. Negative for polyphagia and polyuria.   Genitourinary: Positive for decreased urine volume. Negative for difficulty urinating, dysuria and urgency.   Skin: Positive for wound. Negative for rash.   Neurological: Positive for weakness (generalized). Negative for dizziness, tremors, seizures, syncope, facial asymmetry, speech difficulty, light-headedness, numbness and headaches.   Psychiatric/Behavioral: Negative for confusion and hallucinations.        Objective   Objective   Vital " Signs  Temp:  [97.5 °F (36.4 °C)-99.8 °F (37.7 °C)] 97.8 °F (36.6 °C)  Heart Rate:  [] 136  Resp:  [18-36] 20  BP: ()/(56-96) 141/81  SpO2:  [86 %-97 %] 92 %  on  Flow (L/min):  [3-60] 60;   Device (Oxygen Therapy): other (see comments)  Body mass index is 36.17 kg/m².     Physical Exam   Constitutional: He is oriented to person, place, and time. No distress.   Obese & generally weak   HENT:   Head: Normocephalic and atraumatic.   Eyes: Pupils are equal, round, and reactive to light. EOM are normal.   Neck: Normal range of motion. Neck supple.   Cardiovascular:   Murmur heard.  Tachycardia   Pulmonary/Chest: Effort normal. No respiratory distress. He has no wheezes.   Scattered rhonchi throughout all lung fields   Abdominal: Soft. Bowel sounds are normal. He exhibits no distension. There is no tenderness.   obese   Musculoskeletal: He exhibits edema (BLE trace pitting) and tenderness (LLE).   Neurological: He is alert and oriented to person, place, and time. No cranial nerve deficit or sensory deficit.   Oriented to person, place, time, & situation   Skin: Skin is warm and dry. He is not diaphoretic.   Psychiatric: He has a normal mood and affect. His behavior is normal. Judgment and thought content normal.       Results Review:       I reviewed the patient's new clinical results.  Results from last 7 days   Lab Units 04/14/20  0556 04/13/20  1236   WBC 10*3/mm3 22.33* 22.44*   HEMOGLOBIN g/dL 13.2 14.4   PLATELETS 10*3/mm3 221 247     Results from last 7 days   Lab Units 04/14/20  0556 04/13/20  1236   SODIUM mmol/L 128* 129*   POTASSIUM mmol/L 4.4 4.9   CHLORIDE mmol/L 92* 91*   CO2 mmol/L 23.2 20.1*   BUN mg/dL 45* 35*   CREATININE mg/dL 0.99 1.42*   GLUCOSE mg/dL 339* 346*   Estimated Creatinine Clearance: 114.7 mL/min (by C-G formula based on SCr of 0.99 mg/dL).  Results from last 7 days   Lab Units 04/14/20  0556 04/13/20  1236   ALBUMIN g/dL 2.90* 3.00*   BILIRUBIN mg/dL 1.5* 1.9*   ALK PHOS U/L  98 107   AST (SGOT) U/L 25 24   ALT (SGPT) U/L 27 31     Results from last 7 days   Lab Units 04/14/20  0556 04/13/20  1236   CALCIUM mg/dL 9.4 9.5   ALBUMIN g/dL 2.90* 3.00*   MAGNESIUM mg/dL  --  1.4*     Results from last 7 days   Lab Units 04/14/20  0556 04/13/20  1730 04/13/20  1236   PROCALCITONIN ng/mL  --   --  1.28*   LACTATE mmol/L 2.0 3.8* 2.9*     Hemoglobin A1C   Date/Time Value Ref Range Status   04/14/2020 0556 6.80 (H) 4.80 - 5.60 % Final     Glucose   Date/Time Value Ref Range Status   04/14/2020 0623 323 (H) 70 - 130 mg/dL Final   04/14/2020 0322 299 (H) 70 - 130 mg/dL Final   04/13/2020 1920 324 (H) 70 - 130 mg/dL Final         aspirin 325 mg Oral Daily   atorvastatin 20 mg Oral Daily   enoxaparin 1 mg/kg Subcutaneous Q12H   insulin glargine 56 Units Subcutaneous BID   insulin lispro 0-9 Units Subcutaneous 4x Daily With Meals & Nightly   metoprolol tartrate 25 mg Oral Q12H   sennosides-docusate 1 tablet Oral Daily   sodium chloride 10 mL Intravenous Q12H   venlafaxine 75 mg Oral BID With Meals       amiodarone 0.5 mg/min   Diet Regular; Renal       Assessment/Plan     Active Hospital Problems    Diagnosis  POA   • **Pneumonia [J18.9]  Unknown   • Hyponatremia [E87.1]  Yes   • Hypertriglyceridemia [E78.1]  Yes   • Hyperbilirubinemia [E80.6]  Yes   • Acute deep vein thrombosis (DVT) of right lower extremity (CMS/HCC) [I82.401]  Yes   • Atrial fibrillation with RVR (CMS/HCC) [I48.91]  Yes   • Pulmonary embolus (CMS/HCC) [I26.99]  Yes   • Diabetes mellitus with hyperglycemia (CMS/HCC) [E11.65]  Yes   • CATHERINE (acute kidney injury) (CMS/HCC) [N17.9]  Unknown   • CKD (chronic kidney disease) [N18.9]  Unknown   • Fracture of left leg [S82.92XA]  Unknown   • Sepsis (CMS/HCC) [A41.9]  Unknown   • Jaw pain [R68.84]  Unknown      Resolved Hospital Problems   No resolved problems to display.       65 y.o. male admitted with Pneumonia.      Pneumonia   Pulm following, input appreciated    wesley Werner     Droplet isolation   COVID-19 NOT detected   BC x2 pending results   Ordering VRP   CTA chest ? Bronchitis vs PNA LLL      Pulmonary embolus (CMS/HCC) left lower lobe   Therapeutic lovenox    2D echo EF 73%, no evidence of right heart strain, mild MAC, mild aortic valve regurg   Noted abnormal CTA chest     ·   Atrial fibrillation with RVR   Cards following, input appreciated   amio gtt, metoprolol for now per cardiology       Diabetes mellitus with hyperglycemia (CMS/HCC)   Correctional lispro SS moderate   hgb a1c 6.8   Poor intake   CC diet      CATHERINE (acute kidney injury) (CMS/HCC)   Resolved       CKD (chronic kidney disease)   BL unknown      Fracture of left leg   Request made for previous medical records       Sepsis (CMS/HCC)   likely 2/2 PNA      Jaw pain   Resolved   Troponin, mildly elevated & improved with trend      Hyponatremia   Corrected serum sodium 132      Hypertriglyceridemia   Currently on Lipitor, continued      Hyperbilirubinemia   Continue to monitor for changes   No evidence of elevated LFT   Denies RUQ pain    ·   Acute deep vein thrombosis (DVT) of right lower extremity (CMS/HCC)   currently on treatment lovenox    · Currently on therapeutic enoxaparin for DVT prophylaxis.  · CPR  · Discussed with Dr. Fierro  · Anticipate discharge TBD      SULAIMAN Weller  [facility]  04/14/20  10:34

## 2020-04-14 NOTE — NURSING NOTE
Rapid Response called at approximately 1910. This RN and night shift RN entered as shift change for report. This RN notified pt of change of shift, and asked if he needed anything right now. Pt stated that his jaw area on L & R side was hurting. This RN further asked if this pain was new or something that he has had. Pt stated that pain was new. This RN further asked if pt was anywhere else. Pt stated that arms and legs hurt, but those areas of pain were not new. Vitals were obtained and Rapid Response was called.     This RN had been attempting to get pt's O2 up since he arrived from ER. He arrived on 3L nasal canula, and had been bumped to 4L, then 5L, then 5.5L prior to rapid being called. Pt stated that he only felt SOA with exertion.     Admitting MD had been called at 1850 for admission orders. Call was not returned before rapid was called. 2nd call was placed during rapid. MD returned call, and placed further orders in Epic.

## 2020-04-14 NOTE — PROGRESS NOTES
"Pharmacokinetic Consult - Vancomycin Dosing (Initial Note)    Patient's name: Shakeel Cortez   Consult for: Vancomycin dosing.  Pt Name: Shakeel Cortez   MRN: [id number]  : 1955  / Age: 65 y.o.  Weight: (!) 138 kg (305 lb)  BMI: Body mass index is 36.17 kg/m².      Indication:  Pneumonia, sepsis    Duration of therapy:  7 days.  Consulted by: Dr. Medrano   Goal trough: 15 to 20 mg/L   Other antimicrobials: Zosyn     Relevant clinical data and objective history reviewed:  65 y.o. male 195.6 cm (77\") (!) 138 kg (305 lb)    Past Medical History:   Diagnosis Date    Diabetes mellitus (CMS/[facility])     Hyperlipidemia     Hypertension     Major depressive disorder      Creatinine   Date Value Ref Range Status   2020 0.99 0.76 - 1.27 mg/dL Final   2020 1.42 (H) 0.76 - 1.27 mg/dL Final     BUN   Date Value Ref Range Status   2020 45 (H) 8 - 23 mg/dL Final     Estimated Creatinine Clearance: 114.7 mL/min (by C-G formula based on SCr of 0.99 mg/dL).    Lab Results   Component Value Date    WBC 22.33 (H) 2020     Temp Readings from Last 3 Encounters:   20 98.2 °F (36.8 °C) (Axillary)        Assessment/Plan  Patient received a 2750 mg (20 mg/kg) load yesterday with no subsequent doses for >24 hours. Given the patient's weight and age a random vancomycin trough was ordered to assess clearance and need for a repeat load. The random trough returned subtherapeutic at 7.5 mg/L and was collected ~24 from initial dose. I will start maintenance dosing stat with 1500 mg (~10 mg/kg) every 12 hours.   A trough will be obtained prior to the 4th dose ( @0500) or sooner if clinically indicated.  Patient is high risk for accumulation based on age and weight, any changes in renal function should be followed closely. Will monitor serum creatinine every 24 hours for the first 3 days then at least every 48 hours per dosing recommendations.      Pharmacy will continue to follow daily while on " vancomycin and adjust as needed.     Sincerely,  Alexa Mera, [facility]

## 2020-04-14 NOTE — CONSULTS
Patient Identification:  Shakeel Cortez  65 y.o.  male  1955  [id number]          LOS 0    Requesting physician: Dr Donnelly    Reason for Consultation: Hypoxemic respiratory failure    History of Present Illness:   65-year-old male admitted by hospitalist service with pulmonary embolism.  Presented to the emergency room with complaints of dry cough for the last couple days.  He has been at [facility] for rehab post fracture left leg.  No cardiac-like chest pain.  No nausea or vomiting.  No known exposure to COVID-19.  No fevers or chills.  Shortness of breath is moderate in nature and worse with exertion.  Requiring increased amount of oxygen.  Rapid response called after patient requiring 15 L to maintain sats in low 90s.  COVID-19 rapid testing is negative.  CT scan confirms left pulmonary embolism.  Imaging is not consistent with COVID-19.  Patient is tachycardic and tachypneic.        Past Medical History:  Past Medical History:   Diagnosis Date   • Diabetes mellitus (CMS/HCC)    • Hyperlipidemia    • Hypertension    • Major depressive disorder        Past Surgical History:  History reviewed. No pertinent surgical history.     Home Meds:  Medications Prior to Admission   Medication Sig Dispense Refill Last Dose   • aspirin 325 MG tablet Take 325 mg by mouth Daily.   4/13/2020 at 0800   • HYDROcodone-acetaminophen (NORCO) 7.5-325 MG per tablet Take 1 tablet by mouth Every 4 (Four) Hours As Needed for Moderate Pain .   4/13/2020 at 0800   • insulin glargine (LANTUS) 100 UNIT/ML injection Inject 56 Units under the skin into the appropriate area as directed 2 (Two) Times a Day.   4/11/2020 at 1940   • lisinopril (PRINIVIL,ZESTRIL) 40 MG tablet Take 40 mg by mouth Daily.   4/13/2020 at 0800   • metFORMIN (GLUCOPHAGE) 1000 MG tablet Take 1,000 mg by mouth 2 (Two) Times a Day With Meals.   4/13/2020 at 0800   • metoprolol tartrate (LOPRESSOR) 25 MG tablet Take 25 mg by mouth 2 (Two)  "Times a Day.   4/13/2020 at 0800   • mupirocin (BACTROBAN) 2 % ointment Apply  topically to the appropriate area as directed Daily.      • sennosides-docusate (senna-docusate sodium) 8.6-50 MG per tablet Take 1 tablet by mouth Daily.   4/13/2020 at 0800   • simvastatin (ZOCOR) 40 MG tablet Take 40 mg by mouth Every Night.   4/10/2020 at 2025   • venlafaxine (EFFEXOR) 75 MG tablet Take 75 mg by mouth 2 (Two) Times a Day.   4/13/2020 at 0800   • acetaminophen (TYLENOL) 325 MG tablet Take 650 mg by mouth Every 4 (Four) Hours As Needed for Mild Pain .   Unknown at Unknown time   • ondansetron (ZOFRAN) 4 MG tablet Take 4 mg by mouth Every 8 (Eight) Hours As Needed for Nausea or Vomiting.   Unknown at Unknown time   • polyethylene glycol (MIRALAX) packet Take 17 g by mouth Daily As Needed.   Unknown at Unknown time         Allergies:  No Known Allergies    Social History:   Social History     Socioeconomic History   • Marital status: Single     Spouse name: Not on file   • Number of children: Not on file   • Years of education: Not on file   • Highest education level: Not on file   Tobacco Use   • Smoking status: Never Smoker   Substance and Sexual Activity   • Alcohol use: Not Currently   • Drug use: Never   • Sexual activity: Defer       Family History:  History reviewed. No pertinent family history.    Review of Systems:  Denies fevers or chills  Denies nausea or vomiting  No new vision or hearing changes  No chest pain  No productive cough or shortness of breath  No diarrhea, hematemesis or hematochezia, no dysuria or frequency  No musculoskeletal complaints  No heat or cold intolerance  No skin rashes  No dizziness or confusion.  No seizure activity  No new anxiety or depression  12 system review of systems performed and all else negative    Objective:    PHYSICAL EXAM:    /82 (BP Location: Right arm)   Pulse 115   Temp 97.5 °F (36.4 °C) (Oral)   Resp 22   Ht 195.6 cm (77\")   Wt (!) 138 kg (305 lb)   SpO2 " (!) 86%   BMI 36.17 kg/m²  Body mass index is 36.17 kg/m². (!) 86% (!) 138 kg (305 lb)    GENERAL APPEARANCE:   · Well developed  · Well nourished  · No acute distress   EYES:    · PERRL                                                                           · Conjunctivae normal  · Sclerae nonicteric.  HENT:   · Atraumatic, normocephalic  · External ears and nose normal  · Moist mucous membranes and no ulcers  NECK:  · Thyroid not enlarged  · Trachea midline   RESPIRATORY:    · Nonlabored breathing   · Normal breath sounds  · No rales. No wheezing  · No dullness  CARDIOVASCULAR:    · RRR  · Normal S1, S2  · No murmur  · Lower extremity edema: none    GI:   · Bowel sounds normal  · Abdomen soft , nondistended, nontender  · No abdominal masses  MUSCULOSKELETAL:  · Normal movement of extremities  · No tenderness, no deformities  · No clubbing or cyanosis   Skin:    · No visible rashes  · No palpable nodules  PSYCHIATRIC:  · Speech and behavior appropriate  · Normal mood and affect  · Oriented to person, place and time  NEUROLOGIC:      Lab Review:      Lab Results   Component Value Date    WBC 22.44 (H) 04/13/2020    HGB 14.4 04/13/2020    HCT 42.1 04/13/2020    MCV 88.4 04/13/2020     04/13/2020            Lab Results   Component Value Date    CREATININE 1.42 (H) 04/13/2020    BUN 35 (H) 04/13/2020     (L) 04/13/2020    K 4.9 04/13/2020    CL 91 (L) 04/13/2020    CO2 20.1 (L) 04/13/2020        Lab Results   Component Value Date    TROPONINT 0.013 04/13/2020                Imaging reviewed  chest X-ray and CT chest viewed: Left lower lobe pulmonary embolus noted subpleural atelectasis right lower lobe is seen.  Bronchial wall thickening with mucous plugging and peribronchial infiltrate left lower lobe noted.  Consistent with bronchitis and mild pneumonia.       Assessment:  Acute hypoxemic respiratory failure  Left pulmonary embolism  Right lower infiltrate/pneumonia  Sepsis  Recent femur  fracture  Diabetes mellitus  A. fib with RVR  Lactic acidosis    Recommendations:  Received antibiotics in the emergency room for pneumonia.  Narrow based on culture results.  Oxygen for goal saturation greater than 89%.  Full anticoagulation with Lovenox.  Check lower extremity Dopplers.  With his increase in oxygen requirement I am concerned that he has thrown another clot.  Check 2D echo to evaluate for right heart strain.  RV: LV ratio was 1.0 on CT. consulted Dr. Gray's group with cardiology for potential interventional requirement.  Dr. Donnelly requested to keep him in isolation until seen however this is clearly not consistent with COVID-19 and I will go ahead and discontinue respiratory isolation.  His symptoms are associated with the pulmonary embolism.    Discussed with nursing staff at bedside.      Thank you for allowing me to participate in the care of this patient.  I will continue to follow along with you.      CCT: 73 min    Cheo Macias MD  [facility], [facility]  Pulmonary and Critical Care Medicine    4/13/2020  21:23     Called stat in relation to acute change with irregular heart rhythm.  Rhythm consistent with atrial fibrillation with rapid ventricular response.  Rates up into the 170s.  ABG obtained.  Interventional cardiology consulted 2D echo ordered but cardiology has not seen the patient yet.  Discussed with nursing staff.  Discussed with rapid response team.  Starting amiodarone.  Recheck lactic acid level.    Cheo Macias MD  03:58

## 2020-04-14 NOTE — PROGRESS NOTES
Adult Nutrition  Assessment/PES    Patient Name:  Shakeel Cortez  YOB: 1955  MRN: [id number]  Admit Date:  4/13/2020    Assessment Date:  4/14/2020    Comments:  Nutrition assessment triggered by chart skin report.  Patient with pressure injury to coccyx.  Admitted with PNA, sepsis, acute PE, DVT.  RR called last night.  Admitted from rehab with SOB, dry cough x 2 days.    25% x 1 meal per chart PO data.  Adding Matthew BID to help promote wound healing.    Due to the COVID pandemic, nutrition assessment completed based on review of electronic medical record. This RD currently working remotely and can be reached at [phone], via secure chat or email.     RD will continue to monitor.     Reason for Assessment     Row Name 04/14/20 1050          Reason for Assessment    Reason For Assessment  identified at risk by screening criteria     Diagnosis  diabetes diagnosis/complications;cardiac disease;psychosocial;pulmonary disease;renal disease DM, HLD, HTN, depression, CKD, Afib; adm with PNA, acute PE     Identified At Risk by Screening Criteria  large or nonhealing wound, burn or pressure injury         Anthropometrics     Row Name 04/14/20 1051          Admit Weight    Admit Weight  -- 305# 4/13        Body Mass Index (BMI)    BMI Assessment  BMI 35-39.9: obesity grade II         Labs/Tests/Procedures/Meds     Row Name 04/14/20 1052          Labs/Procedures/Meds    Lab Results Reviewed  reviewed, pertinent     Lab Results Comments  Gluc, Na, BUN, Alb, HGbA1c 6.8, HDL, Triglycerides, WBC        Diagnostic Tests/Procedures    Diagnostic Test/Procedure Reviewed  reviewed, pertinent        Medications    Pertinent Medications Reviewed  reviewed, pertinent     Pertinent Medications Comments  lantus, humalog, pericolace         Physical Findings     Row Name 04/14/20 1053          Physical Findings    Overall Physical Appearance  obese     Skin  pressure injury;other (see comments) B=13, coccyx PI, L leg  wound, bruised         Estimated/Assessed Needs     Row Name 04/14/20 1054          Calculation Measurements    Weight Used For Calculations  94.3 kg (208 lb) IBW        Estimated/Assessed Needs       KCAL/KG    KCAL/KG  30 Kcal/Kg (kcal)     30 Kcal/Kg (kcal)  2830.44        Protein Requirements    Weight Used For Protein Calculations  94.3 kg (208 lb) IBW     Est Protein Requirement Amount (gms/kg)  1.5 gm protein     Estimated Protein Requirements (gms/day)  141.52        Fluid Requirements    Estimated Fluid Requirements (mL/day)  2400         Nutrition Prescription Ordered     Row Name 04/14/20 1055          Nutrition Prescription PO    Current PO Diet  Regular     Common Modifiers  Consistent Carbohydrate;Renal         Evaluation of Received Nutrient/Fluid Intake     Row Name 04/14/20 1055          PO Evaluation    Number of Meals  1     % PO Intake  25         Problem/Interventions:  Problem 1     Row Name 04/14/20 1055          Nutrition Diagnoses Problem 1    Problem 1  Increased Nutrient Needs     Etiology (related to)  Medical Diagnosis     Skin  Pressure injury     Signs/Symptoms (evidenced by)  Report/Observation         Intervention Goal     Row Name 04/14/20 1056          Intervention Goal    General  Maintain nutrition;Reduce/improve symptoms;Disease management/therapy;Meet nutritional needs for age/condition     PO  Increase intake;PO intake (%)     PO Intake %  75 %     Weight  Appropriate weight loss         Nutrition Intervention     Row Name 04/14/20 1056          Nutrition Intervention    RD/Tech Action  Follow Tx progress;Care plan reviewd;Recommend/ordered     Recommended/Ordered  Supplement         Nutrition Prescription     Row Name 04/14/20 1056          Nutrition Prescription PO    PO Prescription  Begin/change supplement     Supplement  Matthew     Supplement Frequency  2 times a day     New PO Prescription Ordered?  Yes         Education/Evaluation     Row Name 04/14/20 1053           Education    Education  Will Instruct as appropriate        Monitor/Evaluation    Monitor  Per protocol;PO intake;Supplement intake;Pertinent labs;Weight;Skin status;Symptoms           Electronically signed by:  Ila Villegas RD  04/14/20 10:57

## 2020-04-14 NOTE — NURSING NOTE
RRT Called. See RN Note by Isamar Champion.    Pt further decompensated prior to transfer to CCU.  Pt intubated on floor by Dr HA assisted by Viji RT and Viji RN. 5CC of propofol pushed for RSI 1L of NS Bolus given, 500mcg of Ramiro pushed and Levophed started and maxed. NG inserted and 4L of coffee ground emesis was sucked from pts stomach.  Pt bradyied down to asystole and CPR was initiated     (see doc). Rosc was achieved and pt was transferred to CCU.  Pt lost pulse during transfer and CPR was initiated.  1729 Code called  1730 1 amp of EPI  1732 1 amp Bicarb  Pt regained pulse.  Dr Garrison finally was able to speak to NOK for plan of care directives.  At 1739 code blue called  1741 1 amp epi, 1 amp BiCarb  Rosc achieved  1742 uncrossed blood initiated  1750 Ramiro started at max dose  1750 1 amp epi given for low HR  1815 Code blue started, 1 amp epi   Family called Dr Garrison back, and resuscitation was stopped but pt had gained his pulse.  Pt  at 1828

## 2020-04-14 NOTE — CODE DOCUMENTATION
Patient became progressively hypotensive after several minutes after intubation in spite of being given Ramiro-Synephrine pushes and eventually had bradycardia and had PEA.  I ran the CODE BLUE. aggressive CPR was performed.  He was given epinephrine and bicarb and started on the Levophed drip.  He eventually had ROSC. Pt will be transferred to CCU.  Please look at progress notes for further details

## 2020-04-14 NOTE — DISCHARGE PLACEMENT REQUEST
"Karine Cortez (65 y.o. Male)     Date of Birth Social Security Number Address Home Phone MRN    1955  [address]  [facility] [address] [phone] [id number]    Confucianist Marital Status          None Single       Admission Date Admission Type Admitting Provider Attending Provider Department, Room/Bed    4/13/20 Emergency Jag Donnelly MD Edling, Stephen A, MD [address], [address]    Discharge Date Discharge Disposition Discharge Destination                       Attending Provider:  Les Fierro MD    Allergies:  No Known Allergies    Isolation:  None   Infection:  None   Code Status:  CPR    Ht:  195.6 cm (77\")   Wt:  138 kg (305 lb)    Admission Cmt:  None   Principal Problem:  Pneumonia [J18.9]                 Active Insurance as of 4/13/2020     Primary Coverage     Payor Plan Insurance Group Employer/Plan Group    MEDICARE MEDICARE A & B      Payor Plan Address Payor Plan Phone Number Payor Plan Fax Number Effective Dates    [address] [phone]  3/1/2020 - None Entered    [facility] [id number]       Subscriber Name Subscriber Birth Date Member ID       KARINE CORTEZ 1955 [id number]           Secondary Coverage     Payor Plan Insurance Group Employer/Plan Group    [employer]AN PAS[employer]ORT [id number]     Payor Plan Address Payor Plan Phone Number Payor Plan Fax Number Effective Dates    [address] [phone]  10/1/2015 - None Entered    [facility] [address]       Subscriber Name Subscriber Birth Date Member ID       KARINE CORTEZ 1955 [id number]                 Emergency Contacts      (Rel.) Home Phone Work Phone Mobile Phone    CONTACT, NO (Other) [phone] -- --              "

## 2020-04-14 NOTE — PROGRESS NOTES
Raúl Medrano MD                          [phone]      Patient ID:    Name:  Shakeel Cortez    MRN:  [id number]    1955   65 y.o.  male            Patient Care Team:  Provider, No Known as PCP - General    CC/ Reason for visit: Acute respiratory failure, pulmonary embolism, pneumonia follow-up    Subjective: Pt seen and examined this AM.  Overnight events noted.  Patient still with significant amount of supplemental oxygen requirements and increased work of breathing.  Stat chest x-ray ordered.  Lower extremity venous duplex confirmed DVT.  Given requirement of 200% OptiFlow we attempted noninvasive ventilator but he is having a tough time wearing the mask.  We will give him a break and reassess again but I have high clinical suspicion that he might decline before he gets better.  Discussed with the primary team as well as nurse.  Appreciate cardiology input and agree with holding interventions for his pulmonary embolism.  Echocardiogram reviewed.     ROS: Unable to obtain due to respiratory failure    Objective     Vital Signs past 24hrs    BP range: BP: ()/(56-96) 115/96  Pulse range: Heart Rate:  [] 136  Resp rate range: Resp:  [18-36] 20  Temp range: Temp (24hrs), Av.4 °F (36.9 °C), Min:97.5 °F (36.4 °C), Max:99.8 °F (37.7 °C)      Ventilator/Non-Invasive Ventilation Settings (From admission, onward)     Start     Ordered    20 1404  NIPPV (CPAP or BIPAP)  Until Discontinued     Question Answer Comment   Indication: Acute Respiratory Failure    Type: AVAPS-AE    NIPPV Mask Interface: Per Patient Preference    Rate 16    VT (mL) 450    EPAP Min (cm H2O) 5    EPAP Max (cm H2O) 10    Max Pressure (cm H2O) 35    Pressure Support Min (cm H2O) 10    Pressure Support Max (cm H2O) 25    Titrate for SPO2 90%        20 1405                Device (Oxygen Therapy): other (see comments)       (!) 138 kg (305 lb); Body mass index is 36.17  kg/m².      Intake/Output Summary (Last 24 hours) at 4/14/2020 1456  Last data filed at 4/14/2020 1300  Gross per 24 hour   Intake 980 ml   Output --   Net 980 ml       PHYSICAL EXAM   Constitutional: Middle aged morbidly obese white male pt in bed, + mild -  acute respiratory distress, ++ accessory muscle use  Head: - NCAT  Eyes: No pallor.  Anicteric sclerae, EOMI.  ENMT:  Mallampati 4, no oral thrush. Moist MM.   NECK: Trachea midline, No thyromegaly, no palpable cervical lymphadenopathy  Heart: RRR, no murmur. LT>Rt 1+ pedal edema   Lungs: WHITNEY +, Dec BS @ bases. No wheezes/ crackles heard    Abdomen: Soft. Obese, No tenderness, guarding or rigidity. No palpable masses  Extremities: Extremities warm and well perfused. No cyanosis/ clubbing. LLE with bandage and brace +  Neuro: Conscious, answers appropriately, no gross focal neuro deficits  Psych: Mood and affect anxious +    Scheduled meds:    aspirin 325 mg Oral Daily   atenolol 25 mg Oral Q12H   atorvastatin 20 mg Oral Daily   enoxaparin 1 mg/kg Subcutaneous Q12H   insulin glargine 56 Units Subcutaneous BID   insulin lispro 0-9 Units Subcutaneous 4x Daily With Meals & Nightly   ipratropium-albuterol 3 mL Nebulization 4x Daily - RT   sennosides-docusate 1 tablet Oral Daily   sodium chloride 10 mL Intravenous Q12H   sodium chloride 4 mL Nebulization BID - RT   venlafaxine 75 mg Oral BID With Meals       IV meds:                        amiodarone 0.5 mg/min Last Rate: 0.5 mg/min (04/14/20 1046)       Data Review:      Results from last 7 days   Lab Units 04/14/20  0556 04/13/20  1236   SODIUM mmol/L 128* 129*   POTASSIUM mmol/L 4.4 4.9   CHLORIDE mmol/L 92* 91*   CO2 mmol/L 23.2 20.1*   BUN mg/dL 45* 35*   CREATININE mg/dL 0.99 1.42*   CALCIUM mg/dL 9.4 9.5   BILIRUBIN mg/dL 1.5* 1.9*   ALK PHOS U/L 98 107   ALT (SGPT) U/L 27 31   AST (SGOT) U/L 25 24   GLUCOSE mg/dL 339* 346*   WBC 10*3/mm3 22.33* 22.44*   HEMOGLOBIN g/dL 13.2 14.4   PLATELETS 10*3/mm3 221 247    PROBNP pg/mL 2,097.0*  --    PROCALCITONIN ng/mL  --  1.28*       Lab Results   Component Value Date    CALCIUM 9.4 04/14/2020       Results from last 7 days   Lab Units 04/13/20  1248 04/13/20  1236   BLOODCX  No growth at 24 hours No growth at 24 hours       Results from last 7 days   Lab Units 04/14/20  0342 04/13/20  1949   PH, ARTERIAL pH units 7.428 7.397   PO2 ART mm Hg 58.0* 66.4*   PCO2, ARTERIAL mm Hg 38.5 39.4   HCO3 ART mmol/L 25.5 24.3        Results Review:    I have reviewed the relevant laboratory results and independently reviewed the chest imaging from this hospitalization including the available echocardiogram reports personally and summarized it if/ when appropriate below    Assessment    Acute hypoxic respiratory failure; severe - Optiflow   Noninvasive ventilator  A. fib with RVR  Acute left lower lobe pulmonary embolism  Left lower lobe pulmonary infarction versus atelectasis  Right medial lower lobe possible pneumonia  Covid 19 ruled out   Sepsis with lactic acidosis  Acute right lower extremity DVT  Chronic left lower extremity DVT  Recent Lt leg #  Morbid obesity    PLAN:  Patient with significantly worsening respiratory failure in the setting of new pulmonary embolism, DVT with concerns for RV dilation on the CT of the chest.  Stat echocardiogram done overnight and reviewed personally does not show significant RV dysfunction.  Noted increasing oxygen requirements overnight.  Stat chest x-ray ordered this morning shows some worsening left lower lobe changes likely atelectasis versus pulmonary infarction based on the CT findings.  Agree with antibiotics for possible pneumonia.  Would narrow down as we obtain more information.  Patient with significant work of breathing and need of 100% heated high flow and hence he was urgently placed on noninvasive ventilator to prevent impending respiratory compromise.    Discussed with the nurse about notifying cardiology that his heart rate has been in  the 140s still in spite of being on amnio drip and might need a second bolus.  Noted cardiology consultation and agree with holding any interventions given small clot burden with not much RV dysfunction.    Patient is a high risk for decompensation and we have a low threshold to transfer to ICU. Spoke to him and he is ok to be intubated if needed. Will notify family as well.      Addendum - Notified that the patient has not been able to tolerate noninvasive ventilator and he was continued on high flow nasal cannula with sats in the low 90s with 100% supplemental oxygen.  He eventually became more confused and became hypotensive.  I was asked to evaluate the patient stat and on arrival he was very drowsy and cold and clammy with low saturations.  This was initially thought to be from worsening hypoxic respiratory failure and we ended up placing him on a mechanical ventilator as he was minimally responsive  to tolerate noninvasive ventilator.  He was intubated uneventfully other than brief hypotension thought to be from rapid sequence intubation but a G-tube was placed due to dark-colored gastric contents needing to be frequently aspirated during the intubation process which eventually resulted in 3 L of coffee-ground emesis needing aspiration.  Patient became severely hypotensive and a CODE BLUE was called.  He was given multiple pushes of epi and started on a Levophed drip and volume resuscitated with IV fluids.  He will be transferred emergently to the ICU.  Discussed the case with Dr. Garrison who will help in placing a central line and continue with resuscitative process with PRBC transfusion.  We will consult gastroenterology stat as well.  Will discontinue anticoagulation obviously.  Given pulmonary embolism and severe respiratory failure I do not think reversing anticoagulation would be a good idea as well.     CC- 80 mins total not including procedures    I have discussed my findings and recommendations with  patient, nursing staff and primary care team.     Raúl Medrano MD  4/14/2020

## 2020-04-14 NOTE — NURSING NOTE
WOCN consult received stage 2 pressure injury on admission. Pictures viewed. Discussed with unit RN  Silicone border dressing in place. RN plans to place accumax pump . Will call if any futher needs

## 2020-04-14 NOTE — NURSING NOTE
Pt remains in a-fib w/ RVR. HR 140s-150s, /96, RR 20. Pt cool, clammy, and nauseous. Pt reports overall not feeling well. Radha Hogan notified and stated will let Dr. De La Garza know.     Call back from [facility] and one time order given for Digoxin 500mcg IV. Will continue to monitor.

## 2020-04-14 NOTE — PLAN OF CARE
Problem: Skin Injury Risk (Adult)  Intervention: Promote/Optimize Nutrition  Flowsheets (Taken 4/14/2020 1100)  Oral Nutrition Promotion: other (see comments); adding Matthew BID to help promote wound healing

## 2020-04-14 NOTE — PROGRESS NOTES
Events noted chart reviewed.  Patient CODE BLUE on the floor intubated by Dr. HA.  Copious amount of blood noted from the GI tract with NG tube draining blood.  CODE BLUE x4 were attempted with pulse resuscitated with bicarb and epinephrine.  Maximum vent support  100% FiO2 PEEP of 10  Maximum pressors were given  O- blood transfused 1 unit  We attempted to contact the family and was able to get hold of the family daughter Taylor who after discussion with other family members proceeded with DNR.  Patient subsequently passed away at 628 and family was notified.    Critical care time 121 minutes excluding CODE BLUE

## 2020-04-14 NOTE — NURSING NOTE
Spoke with Dr. Fierro and stated he had reviewed over patient's information and at this time does not require enhanced droplet/contact precautions.

## 2020-04-14 NOTE — CONSULTS
[facility] Consult Note    Patient Name: Shakeel Cortez  :1955  65 y.o.    Date of Admission: 2020  Date of Consultation:  20  Encounter Provider: Elaina De La Garza MD  Place of Service: [facility] CARDIOLOGY  Referring Provider: Jag Donnelly MD  Patient Care Team:  Provider, No Known as PCP - General      Chief complaint: Pneumonia    Reason for Consult: Acute Pulmonary embolism, atrial fibrillation    History of Present Illness:    Mr Moyer is a 65 year old gentleman with a history of hypertension, diabetes mellitus type 2, hyperlipidemia, recent left leg fracture, who is admitted with acute pulmonary embolism and atrial fibrillation.     Following an admission for his left leg fracture he has been residing at [facility].  He was doing well until he presented to the ED yesterday with SOA worse with exertion and dry cough for a few days.    On arrival his EKG showed atrial fibrillation with 's.  The patient denies any prior history of atrial fibrillation.  His d-dimer was noted to be elevated and he underwent a CT angiogram of the chest that showed a small pulmonary embolism in the left lower lobe with RV:LV ration 1.0 and bronchitis.  A rapid COVID-19 test was negative.  He was started on antibiotics and enoxaparin and admitted for further treatment. Overnight at least 2 rapid responses were called for jaw pain and progressively worsening hypoxia and dyspnea.  This morning he denies any chest pain or palpitations.  He reports that his dyspnea is unchanged.       Previous Cardiac Testing  ECHO 2020  · Left ventricular systolic function is normal. Calculated EF = 73.0%. Estimated EF was in agreement with the calculated EF. Normal left ventricular cavity size and wall thickness noted. All left ventricular wall segments contract normally.  · There is mild calcification of the aortic valve  · Mild aortic valve regurgitation is  present.  · Mild MAC is present.  · Mild dilation of the aortic root is present.  Left Ventricle Left ventricular systolic function is normal. Calculated EF = 73.0%. Estimated EF was in agreement with the calculated EF. Normal left ventricular cavity size and wall thickness noted. All left ventricular wall segments contract normally. Left ventricular diastolic function was indeterminate.   Right Ventricle Normal right ventricular cavity size, wall thickness and systolic function noted.   Left Atrium Normal left atrial size and volume noted.   Right Atrium Normal right atrial size noted.   Aortic Valve The aortic valve is abnormal in structure. There is mild calcification of the aortic valve.Mild aortic valve regurgitation is present. No aortic valve stenosis is present.   Mitral Valve The mitral valve is abnormal in structure. Mild MAC is present. No mitral valve regurgitation is present. No significant mitral valve stenosis is present.   Tricuspid Valve The tricuspid valve is normal. No tricuspid valve regurgitation is present.   Pulmonic Valve The pulmonic valve is structurally normal. There is no significant pulmonic valve stenosis present. There is no pulmonic valve regurgitation present.   Greater Vessels Mild dilation of the aortic root is present.   Pericardium The pericardium is normal. There is no evidence of pericardial effusion.           Past Medical History:   Diagnosis Date   • Diabetes mellitus (CMS/[facility])    • Hyperlipidemia    • Hypertension    • Major depressive disorder        History reviewed. No pertinent surgical history.      Prior to Admission medications    Medication Sig Start Date End Date Taking? Authorizing Provider   aspirin 325 MG tablet Take 325 mg by mouth Daily.   Yes Edgar Wilson MD   HYDROcodone-acetaminophen (NORCO) 7.5-325 MG per tablet Take 1 tablet by mouth Every 4 (Four) Hours As Needed for Moderate Pain .   Yes Edgar Wilson MD   insulin glargine (LANTUS) 100  UNIT/ML injection Inject 56 Units under the skin into the appropriate area as directed 2 (Two) Times a Day.   Yes Edgar Wilson MD   lisinopril (PRINIVIL,ZESTRIL) 40 MG tablet Take 40 mg by mouth Daily.   Yes Edgar Wilson MD   metFORMIN (GLUCOPHAGE) 1000 MG tablet Take 1,000 mg by mouth 2 (Two) Times a Day With Meals.   Yes Edgar Wilson MD   metoprolol tartrate (LOPRESSOR) 25 MG tablet Take 25 mg by mouth 2 (Two) Times a Day.   Yes Edgar Wilson MD   mupirocin (BACTROBAN) 2 % ointment Apply  topically to the appropriate area as directed Daily.   Yes Edgar Wilson MD   sennosides-docusate (senna-docusate sodium) 8.6-50 MG per tablet Take 1 tablet by mouth Daily.   Yes Edgar Wilson MD   simvastatin (ZOCOR) 40 MG tablet Take 40 mg by mouth Every Night.   Yes Edgar Wilson MD   venlafaxine (EFFEXOR) 75 MG tablet Take 75 mg by mouth 2 (Two) Times a Day.   Yes Edgar Wilson MD   acetaminophen (TYLENOL) 325 MG tablet Take 650 mg by mouth Every 4 (Four) Hours As Needed for Mild Pain .    Edgar Wilson MD   ondansetron (ZOFRAN) 4 MG tablet Take 4 mg by mouth Every 8 (Eight) Hours As Needed for Nausea or Vomiting.    Edgar Wilson MD   polyethylene glycol (MIRALAX) packet Take 17 g by mouth Daily As Needed.    Edgar Wilson MD       No Known Allergies    Social History     Socioeconomic History   • Marital status: Single     Spouse name: Not on file   • Number of children: Not on file   • Years of education: Not on file   • Highest education level: Not on file   Tobacco Use   • Smoking status: Never Smoker   Substance and Sexual Activity   • Alcohol use: Not Currently   • Drug use: Never   • Sexual activity: Defer       History reviewed. No pertinent family history.    REVIEW OF SYSTEMS:   All systems reviewed.  Pertinent positives identified in HPI.  All other systems are negative.      Objective:     Vitals:    04/14/20 0357 04/14/20  0400 04/14/20 0403 04/14/20 0700   BP:    141/81   BP Location:    Left arm   Patient Position:    Lying   Pulse: (!) 135 (!) 129 (!) 145 (!) 136   Resp:    20   Temp:    97.8 °F (36.6 °C)   TempSrc:    Oral   SpO2: 92% 91% 93% (!) 88%   Weight:       Height:         Body mass index is 36.17 kg/m².    General Appearance:    Alert, cooperative, in no acute distress   Head:    Normocephalic, without obvious abnormality, atraumatic   Eyes:            Lids and lashes normal, conjunctivae and sclerae normal, no icterus, no pallor, corneas clear, PERRLA   Ears:    Ears appear intact with no abnormalities noted   Neck:   No adenopathy, supple, trachea midline, no thyromegaly, no carotid bruit, no JVD   Lungs:     Clear to auscultation, respirations regular, even and unlabored    Heart:    Irregularly, irregular, rhythm and normal rate, normal S1 and S2, no murmur, no gallop, no rub, no click   Chest Wall:    No abnormalities observed   Abdomen:     Normal bowel sounds, no masses, no organomegaly, soft, nontender, nondistended, no guarding, no rebound  tenderness   Extremities:   Moves all extremities well, no edema, no cyanosis, no redness   Pulses:   Pulses palpable and equal bilaterally. Normal radial, carotid, femoral, dorsalis pedis and posterior tibial pulses bilaterally. Normal abdominal aorta   Skin:  Psychiatric:   No bleeding, bruising or rash    Alert and oriented x 3, normal mood and affect   Lab Review:     Results from last 7 days   Lab Units 04/14/20  0556   SODIUM mmol/L 128*   POTASSIUM mmol/L 4.4   CHLORIDE mmol/L 92*   CO2 mmol/L 23.2   BUN mg/dL 45*   CREATININE mg/dL 0.99   CALCIUM mg/dL 9.4   BILIRUBIN mg/dL 1.5*   ALK PHOS U/L 98   ALT (SGPT) U/L 27   AST (SGOT) U/L 25   GLUCOSE mg/dL 339*     Results from last 7 days   Lab Units 04/14/20  0556 04/13/20  1932 04/13/20  1236   TROPONIN T ng/mL 0.012 0.013 0.019     Results from last 7 days   Lab Units 04/14/20  0556   WBC 10*3/mm3 22.33*   HEMOGLOBIN  g/dL 13.2   HEMATOCRIT % 38.9   PLATELETS 10*3/mm3 221         Results from last 7 days   Lab Units 04/13/20  1236   MAGNESIUM mg/dL 1.4*     Results from last 7 days   Lab Units 04/14/20  0556   CHOLESTEROL mg/dL 87   TRIGLYCERIDES mg/dL 197*   HDL CHOL mg/dL 14*   LDL CHOL mg/dL 34             Telemetry      EKG this AM    EKG on admission 04/13/2020      I personally viewed and interpreted the patient's EKG/Telemetry data.        Assessment and Plan:       1. Pulmonary embolism.  Small left lower lobe pulmonary embolism with no evidence of right ventricular strain. On anticoagulation with enoxaparin.   2. Atrial fibrillation.  With rapid ventricular rate.  Started on amiodarone gtt overnight.  Rate still elevated.  Echo with normal LV and RV function and no significant valvular disease.    3. Acute hypoxic respiratory failure. Still requiring a high amount of O2.  Pulmonary following.  4. Right lower lobe pneumonia. On antibiotics.   5. Diabetes mellitus type 2  6. Recent left femur fracture    -  No indication for intervention of pulmonary embolism.  Would continue treatment with therapeutic anticoagulation.   - Continue amiodarone gtt.    - Switch metoprolol to atenolol to see if we can achieve better rate control  - Hypoxia likely due to combination of pneumonia and pulmonary embolism       Elaina De La Garza MD  04/14/20  08:12

## 2020-04-18 LAB
BACTERIA SPEC AEROBE CULT: NORMAL
BACTERIA SPEC AEROBE CULT: NORMAL

## 2020-04-26 NOTE — DISCHARGE SUMMARY
PHYSICIAN DISCHARGE SUMMARY                                                                        [facility]    Patient Identification:  Name: Shakeel Cortez  Age: 65 y.o.  Sex: male  :  1955  MRN: [id number]  Primary Care Physician: Provider, No Known    Admit date: 2020  Discharge date and time: 2020, time of death 6:28 PM  Discharged Condition:      Discharge Diagnoses:  Pneumonia    Pulmonary embolus (CMS/HCC)    Diabetes mellitus with hyperglycemia (CMS/HCC)    CATHERINE (acute kidney injury) (CMS/HCC)    CKD (chronic kidney disease)    Fracture of left leg    Sepsis (CMS/HCC)    Jaw pain    Hyponatremia    Hypertriglyceridemia    Hyperbilirubinemia    Acute deep vein thrombosis (DVT) of right lower extremity (CMS/HCC)    Atrial fibrillation with RVR (CMS/HCC)       PMHX:   Past Medical History:   Diagnosis Date   • Diabetes mellitus (CMS/HCC)    • Hyperlipidemia    • Hypertension    • Major depressive disorder      PSHX: History reviewed. No pertinent surgical history.    Hospital Course:  See initial H&P on 2020.   Shakeel Cortez is a 65-year-old male who fractured his left leg about a month ago and was subsequently at the rehab facility.  Patient was admitted to our facility on 2020 when he presented with dry cough associated with shortness of breath.  He was also complaining of some jaw discomfort.  In the emergency room he was found to have atrial fibrillation with rapid ventricular response along with right-sided lung infiltrate.  Patient was hypoxic.  Because of elevated d-dimer and tachycardia CT scan of the chest PE protocol performed which showed pulmonary embolus.  Patient was started on anticoagulation therapy pulmonary service was consulted and cardiology service was consulted.  Patient continued to deteriorate with progressive hypoxia and tachycardia resulting in transfer to  ICU.  Patient had cardiopulmonary arrest and hypotensive episodes requiring intubation and resuscitative efforts.  Eventually patient coded in ICU and after prolonged effort patient was not responding and did not regain his pulse and blood pressure.  Resuscitative effort was discontinued after discussion with the family members and patient was made DNR.  Patient subsequently passed away and pronounced dead at 6:28 PM.    Consults:     Consults     Date and Time Order Name Status Description    4/13/2020 2157 Inpatient Cardiology Consult Completed     4/13/2020 1949 Inpatient Pulmonology Consult Completed     4/13/2020 1530 [facility] (on-call MD unless specified) Details Completed                 Significant Diagnostic Studies: Reviewed  Imaging Results (All)     Procedure Component Value Units Date/Time    XR Chest Post CVA Port [[id number]] Collected:  04/14/20 1839     Updated:  04/14/20 1842    Narrative:       PORTABLE CHEST X-RAY     HISTORY: Cardiopulmonary arrest. Central line placement.     Two portable images of the chest from around 6:10 PM are provided and  correlated with x-ray from around 4:58 PM.     There is a new external cardiac pacing pad. The endotracheal tube tip  lies about 6.5 cm above the brian and there is a Dobbhoff tube coursing  off the caudal edge of the field-of-view.     There is a new right IJ central line which is position as expected over  the region of the superior vena cava. There is a new right pneumothorax  that measures about 3.8 cm at the apex. There does not appear to be  significant midline shift, given the leftward obliquity of the patient.  Dense opacity in the perihilar lungs with air bronchograms is again  observed. There is no pneumothorax on the left. There is no pleural  effusion. There is a right fourth rib fracture which is new. There may  be fifth and sixth rib fractures on the right and there appears to be a  left fifth rib fracture anteriorly.       Impression:        Right IJ central line is position as expected. There are new  rib fractures, likely the result of CPR having been performed in the  interval. There is a pneumothorax on the right as well.     I called the findings to the nurse caring for the patient in the CCU at  6:35 PM, and I am told that the patient has .     This report was finalized on 2020 6:39 PM by Dr. Aron Joy M.D.       XR Chest 1 View [[id number]] Collected:  20 1734     Updated:  20 173    Narrative:       XR CHEST 1 VW-  2020     HISTORY: Intubation.     The endotracheal tube is seen with its tip overlying the trachea at the  level of the aortic arch. NG tube courses into the distal esophagus. Its  tip is not included in the field-of-view.     There is mild cardiomegaly. Lungs are slightly underinflated. There is a  moderate patchy infiltrate in the right perihilar region and right base  as well as mild to moderate infiltrate in the left lung base. This  finding has progressed somewhat since the 2020 study.     No pneumothorax is seen.       Impression:       Satisfactory position of endotracheal tube and NG tube  passes though the NG tube tip is not included in the field-of-view).  2. Patchy bilateral pulmonary infiltrates right worse than left. These  have progressed somewhat since the 2020 study.     Comment: FINDINGS could represent Covid pneumonia and clinical  correlation is recommended.     This report was finalized on 2020 5:36 PM by Dr. Raman Maradiaga M.D.       XR Chest 1 View [[id number]] Collected:  20 1248     Updated:  20 1255    Narrative:       Portable chest radiograph     HISTORY:Persistent hypoxia, negative COVID-19     TECHNIQUE: Single AP portable radiograph of the chest     COMPARISON:CTA head chest and chest radiograph 2020       Impression:       FINDINGS AND IMPRESSION:  The lungs are hypoinflated.     Bibasilar pulmonary opacification is present, as  before. Findings appear  to have mildly worsened within the left base since 04/13/2020 and given  findings on recent CTA may represent sequela of developing pulmonary  infarction and/or worsening pneumonia in the appropriate clinical  context and correlation with patient history is recommended. No  pneumothorax or pleural effusion is seen. Heart size accentuated by low  lung volumes.     This report was finalized on 4/14/2020 12:52 PM by Dr. Dae Gallardo M.D.       CT Angiogram Chest [[id number]] Collected:  04/13/20 1530     Updated:  04/13/20 1610    Narrative:       CT ANGIOGRAM CHEST-     Radiation dose reduction techniques were utilized, including automated  exposure control and exposure modulation based on body size.     CLINICAL: Chest pain, positive D-dimer, suspect pulmonary embolus.     CT scan of the chest with intravenous contrast, pulmonary embolus  protocol to include CT angiogram and three-dimensional reconstruction.     FINDINGS: Solitary small left lower lobe pulmonary embolus is  demonstrated. RV:LV ratio is 1. Cardiac size within normal limits. No  pericardial abnormality. Esophagus is satisfactory in course and  caliber. Diameter of the aorta is normal. No mediastinal or hilar  mass/lymphadenopathy. Located along the medial right lower lobe there is  curvilinear subpleural area of likely atelectasis. Within the left lower  lobe there is bronchial wall thickening with mucous plugging and subtle  peribronchial infiltrate having the appearance of bronchitis/mild  pneumonia. No pleural effusion is demonstrated. Thyroid is partially  demonstrated, asymmetric enlargement on the right with some  calcification.     CONCLUSION:  1. Solitary small left lower lobe pulmonary embolus, RV:LV ratio is 1.  2. Curvilinear area of likely subpleural atelectasis medial right lower  lobe.  3. Bronchial wall thickening with mucous plugging and subtle  peribronchial infiltrate left lower lobe. This suggests  bronchitis/mild  pneumonia.     Findings of this report called to Dr. Torres in the emergency room at the  time of completion, 3:30 PM.        This report was finalized on 4/13/2020 4:07 PM by Dr. Louis Le M.D.       XR Chest AP [[id number]] Collected:  04/13/20 1315     Updated:  04/13/20 1327    Narrative:       AP PORTABLE UPRIGHT CHEST     HISTORY: Shortness of air and wheezing. Possible COVID-19.     COMPARISON: None.     FINDINGS: Cardiomediastinal silhouette is within normal limits allowing  for dextroscoliotic curvature of the thoracic spine. There is mild  bilateral hilar prominence with increased density in the infrahilar  regions and this may be associated with perihilar scarring or  atelectasis, though mild perihilar infiltrates are also possible. No  peripheral infiltrates are evident and there is no findings specific for  COVID-19. No pulmonary edema or definite effusion is demonstrated.       Impression:       Prominence of the zara bilaterally. This may be related to  perihilar atelectasis or scarring, though perihilar infiltrates are  possible. No peripheral infiltrate is demonstrated and there is no  finding specific for COVID-19. No pulmonary edema or pleural effusion.  Follow up PA and lateral chest may be helpful.     This report was finalized on 4/13/2020 1:24 PM by Dr. Venu Hilario M.D.               Discharge Exam:  General Appearance:    Alert, cooperative, no distress, AAOx3                          Head:    Normocephalic, without obvious abnormality, atraumatic                          Eyes:    PERRL, conjunctivae/corneas clear, EOM's intact, both eyes                        Throat:   Lips, tongue, gums normal; oral mucosa pink and moist                          Neck:   Supple, symmetrical, trachea midline, no JVD                        Lungs:     Clear to auscultation bilaterally, respirations unlabored                Chest Wall:    No tenderness or deformity                         Heart:    Regular rate and rhythm, S1 and S2 normal, no murmur,no  Rub                                       or gallop                  Abdomen:     Soft, non-tender, bowel sounds active, no masses, no                                                        organomegaly                  Extremities:   Extremities normal, atraumatic, no cyanosis or edema, pulses                                           palpable in all extremities                             Skin:   Skin is warm and dry,  no rashes or palpable lesions                  Neurologic:   CNII-XII intact, motor strength grossly intact, sensation grossly                                           intact to light touch, no focal deficits noted     Disposition:  [place] [facility] [place]    Patient Instructions:      Discharge Medications      ASK your doctor about these medications      Instructions Start Date   acetaminophen 325 MG tablet  Commonly known as:  TYLENOL   650 mg, Oral, Every 4 Hours PRN      aspirin 325 MG tablet   325 mg, Oral, Daily      HYDROcodone-acetaminophen 7.5-325 MG per tablet  Commonly known as:  NORCO   1 tablet, Oral, Every 4 Hours PRN      insulin glargine 100 UNIT/ML injection  Commonly known as:  LANTUS   56 Units, Subcutaneous, 2 Times Daily      lisinopril 40 MG tablet  Commonly known as:  PRINIVIL,ZESTRIL   40 mg, Oral, Daily      metFORMIN 1000 MG tablet  Commonly known as:  GLUCOPHAGE   1,000 mg, Oral, 2 Times Daily With Meals      metoprolol tartrate 25 MG tablet  Commonly known as:  LOPRESSOR   25 mg, Oral, 2 Times Daily      mupirocin 2 % ointment  Commonly known as:  BACTROBAN   Topical, Daily      ondansetron 4 MG tablet  Commonly known as:  ZOFRAN   4 mg, Oral, Every 8 Hours PRN      polyethylene glycol packet  Commonly known as:  MIRALAX   17 g, Oral, Daily PRN      sennosides-docusate 8.6-50 MG per tablet  Commonly known as:  PERICOLACE   1 tablet, Oral, Daily      simvastatin 40 MG tablet  Commonly known as:   ZOCOR   40 mg, Oral, Nightly      venlafaxine 75 MG tablet  Commonly known as:  EFFEXOR   75 mg, Oral, 2 Times Daily           No future appointments.  Follow-up Information     Provider, No Known .    Contact information:  [place] [id number] [phone]                 Discharge Order (From admission, onward)    None        Follow-up Information     Provider, No Known .    Contact information:  [place] [id number] [phone]                 Total time spent discharging patient including evaluation,post hospitalization follow up,  medication and post hospitalization instructions and education total time exceeds 30 minutes.    Signed:  Jag Donnelly MD  4/26/2020  07:50    EMR Dragon/Transcription disclaimer:   Much of this encounter note is an electronic transcription/translation of spoken language to printed text. The electronic translation of spoken language may permit erroneous, or at times, nonsensical words or phrases to be inadvertently transcribed; Although I have reviewed the note for such errors, some may still exist.